# Patient Record
Sex: FEMALE | Race: WHITE | NOT HISPANIC OR LATINO | Employment: PART TIME | URBAN - METROPOLITAN AREA
[De-identification: names, ages, dates, MRNs, and addresses within clinical notes are randomized per-mention and may not be internally consistent; named-entity substitution may affect disease eponyms.]

---

## 2019-03-15 ENCOUNTER — OFFICE VISIT (OUTPATIENT)
Dept: OBSTETRICS AND GYNECOLOGY | Facility: CLINIC | Age: 74
End: 2019-03-15
Payer: COMMERCIAL

## 2019-03-15 VITALS
SYSTOLIC BLOOD PRESSURE: 117 MMHG | DIASTOLIC BLOOD PRESSURE: 75 MMHG | HEART RATE: 96 BPM | WEIGHT: 203 LBS | BODY MASS INDEX: 30.77 KG/M2 | HEIGHT: 68 IN

## 2019-03-15 DIAGNOSIS — N89.8 VAGINAL LESION: Primary | ICD-10-CM

## 2019-03-15 DIAGNOSIS — N32.81 OAB (OVERACTIVE BLADDER): ICD-10-CM

## 2019-03-15 PROBLEM — M48.02 SPINAL STENOSIS IN CERVICAL REGION: Status: ACTIVE | Noted: 2019-03-15

## 2019-03-15 PROBLEM — M48.061 SPINAL STENOSIS OF LUMBAR REGION: Status: ACTIVE | Noted: 2019-03-15

## 2019-03-15 PROBLEM — K22.89 PRESBYESOPHAGUS: Status: ACTIVE | Noted: 2018-06-23

## 2019-03-15 PROBLEM — I48.91 ATRIAL FIBRILLATION: Status: ACTIVE | Noted: 2019-03-15

## 2019-03-15 PROBLEM — E78.00 PURE HYPERCHOLESTEROLEMIA: Status: ACTIVE | Noted: 2019-03-15

## 2019-03-15 PROBLEM — E78.5 DYSLIPIDEMIA: Status: ACTIVE | Noted: 2019-03-15

## 2019-03-15 PROBLEM — D17.20 LIPOMA OF FOREARM: Status: ACTIVE | Noted: 2019-03-15

## 2019-03-15 PROBLEM — M72.2 PLANTAR FASCIITIS: Status: ACTIVE | Noted: 2019-03-15

## 2019-03-15 PROBLEM — R29.818 NEUROGENIC CLAUDICATION: Status: ACTIVE | Noted: 2019-03-15

## 2019-03-15 PROBLEM — J45.909 REACTIVE AIRWAY DISEASE: Status: ACTIVE | Noted: 2018-05-25

## 2019-03-15 PROBLEM — G60.9 IDIOPATHIC PERIPHERAL NEUROPATHY: Status: ACTIVE | Noted: 2019-03-15

## 2019-03-15 PROBLEM — M25.559 HIP PAIN: Status: ACTIVE | Noted: 2019-03-15

## 2019-03-15 PROBLEM — K21.9 GASTROESOPHAGEAL REFLUX DISEASE: Status: ACTIVE | Noted: 2019-03-15

## 2019-03-15 PROBLEM — I10 BENIGN ESSENTIAL HYPERTENSION: Status: ACTIVE | Noted: 2019-03-15

## 2019-03-15 PROCEDURE — 99204 OFFICE O/P NEW MOD 45 MIN: CPT | Mod: 25,S$GLB,, | Performed by: OBSTETRICS & GYNECOLOGY

## 2019-03-15 PROCEDURE — 57100 BIOPSY (GYNECOLOGICAL): ICD-10-PCS | Mod: S$GLB,,, | Performed by: OBSTETRICS & GYNECOLOGY

## 2019-03-15 PROCEDURE — 57100 BIOPSY VAGINAL MUCOSA SIMPLE: CPT | Mod: S$GLB,,, | Performed by: OBSTETRICS & GYNECOLOGY

## 2019-03-15 PROCEDURE — 99204 PR OFFICE/OUTPT VISIT, NEW, LEVL IV, 45-59 MIN: ICD-10-PCS | Mod: 25,S$GLB,, | Performed by: OBSTETRICS & GYNECOLOGY

## 2019-03-15 RX ORDER — ATORVASTATIN CALCIUM 40 MG/1
TABLET, FILM COATED ORAL
COMMUNITY

## 2019-03-15 RX ORDER — OXYBUTYNIN CHLORIDE 10 MG/1
10 TABLET, EXTENDED RELEASE ORAL DAILY
Qty: 30 TABLET | Refills: 11 | Status: SHIPPED | OUTPATIENT
Start: 2019-03-15 | End: 2023-04-14 | Stop reason: SDUPTHER

## 2019-03-15 RX ORDER — GABAPENTIN 300 MG/1
CAPSULE ORAL
COMMUNITY

## 2019-03-15 RX ORDER — HYDROCHLOROTHIAZIDE 25 MG/1
TABLET ORAL
COMMUNITY
Start: 2018-05-25 | End: 2020-09-24

## 2019-03-15 RX ORDER — OXYBUTYNIN CHLORIDE 5 MG/1
TABLET, EXTENDED RELEASE ORAL
COMMUNITY
Start: 2018-05-25 | End: 2019-03-15 | Stop reason: DRUGHIGH

## 2019-03-15 RX ORDER — MONTELUKAST SODIUM 10 MG/1
TABLET ORAL
COMMUNITY
End: 2019-03-30 | Stop reason: SDUPTHER

## 2019-03-15 NOTE — PROCEDURES
Biopsy (Gynecological)  Date/Time: 3/15/2019 10:26 AM  Performed by: Apolinar Moffett MD  Authorized by: Apolinar Moffett MD     Consent Done?:  Yes (Verbal)  Local anesthesia used?: Yes    Anesthesia:  Local infiltration  Local anesthetic:  Lidocaine 1% with epinephrine and lidocaine 2% topical gel    Biopsy Location:  Vagina  Vagina:     Complexity:  Simple    Site:  Right Vaginal Side Wall  Estimated blood loss (cc):  0   Patient tolerated the procedure well with no immediate complications.

## 2019-03-16 NOTE — PROGRESS NOTES
"CC: Vaginal discharge    Bonny Luciano is a 73 y.o. female presents with complaint of vaginal discoloration for 7-8 months. She also noted a rash for the last 5 days on the outer vulva.     Past Medical History:   Diagnosis Date    Bladder problem     Hyperlipidemia     Hypertension      History reviewed. No pertinent surgical history.  Social History     Tobacco Use    Smoking status: Never Smoker   Substance Use Topics    Alcohol use: Yes     Frequency: 2-4 times a month    Drug use: No     History reviewed. No pertinent family history.  OB History   No data available         ROS:  GENERAL: No fever, chills, fatigability or weight loss.  VULVAR: No pain, no lesions and no itching.  VAGINAL: No relaxation, no itching, no discharge, no abnormal bleeding and no lesions.  ABDOMEN: No abdominal pain. Denies nausea. Denies vomiting. No diarrhea. No constipation  BREAST: Denies pain. No lumps. No discharge.  URINARY: No incontinence, no nocturia, no frequency and no dysuria.  CARDIOVASCULAR: No chest pain. No shortness of breath. No leg cramps.  NEUROLOGICAL: No headaches. No vision changes.      /75 (BP Location: Left arm, Patient Position: Sitting)   Pulse 96   Ht 5' 8" (1.727 m)   Wt 92.1 kg (203 lb)   BMI 30.87 kg/m²     PHYSICAL EXAM:  NAD  Soft NTND   NEFG erythematous rash on bilateral labia majora. Right gluteal fold at perineum has a cluster of blisters   Vagina with black discoloration in patches that are flat until jail up the vagina   No cce     ASSESSMENT and PLAN:    ICD-10-CM ICD-9-CM    1. Vaginal lesion N89.8 623.8 Biopsy (Gynecological)           HSV swab collected   Discussed bladder leakage - increase oxybutynin   bx taken of discolored area   FOLLOW UP: PRN will call with bx results   "

## 2019-03-18 LAB
HSV 1 DNA: NEGATIVE
HSV 2 DNA: NEGATIVE
SPECIMEN SITE: NORMAL

## 2019-03-22 ENCOUNTER — TELEPHONE (OUTPATIENT)
Dept: GYNECOLOGIC ONCOLOGY | Facility: CLINIC | Age: 74
End: 2019-03-22

## 2019-03-22 NOTE — TELEPHONE ENCOUNTER
----- Message from Mik Cruz MD sent at 3/21/2019  4:27 PM CDT -----  I can see her Tuesday at 10 AM.     Stevie,   Please call her and give her an appt to see me Tuesday 3/26 at 10 AM.     Thank you.    ----- Message -----  From: Elif Ruffin MD  Sent: 3/21/2019   3:11 PM  To: Mik Cruz MD, MD Dr. Zuhair Porter--one of the [facility] ob gyns called me today regarding this patient. New vaginal melanoma/melanoma in situ per path.     Can either of you see her in next coming weeks?     ----- Message -----  From: Freda Mazariegos MA  Sent: 3/21/2019   2:27 PM  To: Elif Ruffin MD    The pt for previous message     ----- Message -----  From: Christine Veloz  Sent: 3/21/2019   2:20 PM  To: Augustin Asencio Staff    Name of Who is Calling:Dr. Lopes ([facility])      What is the request in detail: Dr lopes wants to speak directly to Dr Ruffin in ref to the pt       Can the clinic reply by [facility]:   No       What Number to Call Back if not in [facility]: [phone]

## 2019-03-22 NOTE — TELEPHONE ENCOUNTER
----- Message from Smita Still LPN sent at 3/22/2019 10:04 AM CDT -----  Contact: VAUGHN RICKETTS [[id number]]      ----- Message -----  From: Pushpa Montelongo  Sent: 3/22/2019   9:42 AM  To: Nancy BREEN Staff    Type:  Patient Returning Call    Who Called: VAUGHN RICKETTS [[id number]]    Who Left Message for Patient: Stevie Byrd    Does the patient know what this is regarding? Pt advises March 26 th works for her.    Best Call Back Number:[phone]    Additional Information:

## 2019-03-25 ENCOUNTER — TELEPHONE (OUTPATIENT)
Dept: GYNECOLOGIC ONCOLOGY | Facility: CLINIC | Age: 74
End: 2019-03-25

## 2019-03-25 ENCOUNTER — DOCUMENTATION ONLY (OUTPATIENT)
Dept: GYNECOLOGIC ONCOLOGY | Facility: HOSPITAL | Age: 74
End: 2019-03-25

## 2019-03-25 NOTE — PROGRESS NOTES
"Subjective:       Patient ID: Bonny Luciano is a 73 y.o. female.    Chief Complaint: Consult (ref by Dr Moffett)    HPI     73yr old referred from Dr. Moffett for vulvar melanoma. Patient reports color changes to labia for the past year. No itching, discharge or bleeding. Not painful. No other GYN complaints.      Dr. Moffett notes: "erythematous rash on bilateral labia majora. Right gluteal fold at perineum has a cluster of blisters. Vagina with black discoloration in patches that are flat until intermediate up the vagina"     Biopsied and resulted atypical junctional melanocytic proliferation. Atypical melanocytes extend to the edge of the biopsy specimen. Re-excision with adequate margins is recommended.     HSV culture negative.     She has been referred for further evaluation.     Surgical history: hysterectomy    Family history negative for breast, uterine, ovarian and colon cancer. Patient has no history of abnormal skin lesions or skin cancer.    Past Medical History:   Diagnosis Date    Bladder disorder     Bladder problem     Hyperlipidemia     Hypertension     Malignant melanoma of torso excluding breast 3/26/2019     Past Surgical History:   Procedure Laterality Date    HAND SURGERY      HYSTERECTOMY       Family History   Problem Relation Age of Onset    Diabetes Mother      Social History     Tobacco Use    Smoking status: Never Smoker    Smokeless tobacco: Never Used   Substance Use Topics    Alcohol use: Yes     Frequency: 2-4 times a month    Drug use: No     Review of patient's allergies indicates:  No Known Allergies    Current Outpatient Medications:     albuterol (PROVENTIL) 2.5 mg /3 mL (0.083 %) nebulizer solution, Take 2.5 mg by nebulization as needed. , Disp: , Rfl: 0    atorvastatin (LIPITOR) 40 MG tablet, atorvastatin 40 mg tablet  Take 1 tablet every day by oral route., Disp: , Rfl:     cefdinir (OMNICEF) 300 MG capsule, Take 600 mg by mouth once daily. , Disp: , Rfl: 0    " "CHERATUSSIN AC  mg/5 mL syrup, Take 5 mLs by mouth every 6 (six) hours as needed. , Disp: , Rfl: 0    gabapentin (NEURONTIN) 300 MG capsule, gabapentin 300 mg capsule  TAKE ONE CAPSULE BY MOUTH THREE TIMES DAILY THEREAFTER, Disp: , Rfl:     hydroCHLOROthiazide (HYDRODIURIL) 25 MG tablet, hydrochlorothiazide 25 mg tablet  Take 1 tablet every day by oral route., Disp: , Rfl:     methylPREDNISolone (MEDROL DOSEPACK) 4 mg tablet, Take 4 mg by mouth once daily. , Disp: , Rfl: 0    montelukast (SINGULAIR) 10 mg tablet, montelukast 10 mg tablet  TK ONE T PO D, Disp: , Rfl:     oxybutynin (DITROPAN-XL) 10 MG 24 hr tablet, Take 1 tablet (10 mg total) by mouth once daily., Disp: 30 tablet, Rfl: 11    ranitidine (ZANTAC) 150 MG capsule, ranitidine 150 mg capsule  Take 1 capsule twice a day by oral route., Disp: , Rfl:   No current facility-administered medications for this visit.     Review of Systems   Constitutional: Negative for appetite change, chills, diaphoresis, fatigue, fever and unexpected weight change.   Respiratory: Negative for cough, chest tightness, shortness of breath and wheezing.    Cardiovascular: Negative for chest pain, palpitations and leg swelling.   Gastrointestinal: Negative for abdominal distention, abdominal pain, blood in stool, constipation, diarrhea, nausea and vomiting.   Genitourinary: Positive for genital sores. Negative for difficulty urinating, dysuria, flank pain, frequency, hematuria, pelvic pain, vaginal bleeding, vaginal discharge and vaginal pain.   Musculoskeletal: Negative for arthralgias and back pain.   Skin: Positive for color change. Negative for rash.   Neurological: Negative for dizziness, weakness, numbness and headaches.   Hematological: Negative for adenopathy.   Psychiatric/Behavioral: Negative for confusion and sleep disturbance. The patient is not nervous/anxious.        Objective:   /68   Pulse 84   Ht 5' 8" (1.727 m)   Wt 90.7 kg (199 lb 15.3 oz)   " BMI 30.40 kg/m²      Physical Exam   Constitutional: She is oriented to person, place, and time. She appears well-developed and well-nourished. No distress.   HENT:   Head: Normocephalic and atraumatic.   Eyes: No scleral icterus.   Neck: Normal range of motion.   Cardiovascular: Normal rate and regular rhythm.   No murmur heard.  Pulmonary/Chest: Effort normal and breath sounds normal. No respiratory distress. She has no wheezes.   Abdominal: Soft. She exhibits no distension and no mass. There is no tenderness. There is no guarding.   Musculoskeletal: Normal range of motion. She exhibits no edema.   Neurological: She is alert and oriented to person, place, and time.   Skin: Skin is warm and dry. No rash noted. She is not diaphoretic. No pallor.   Psychiatric: She has a normal mood and affect. Her behavior is normal.   Nursing note and vitals reviewed.      Assessment:       1. Malignant melanoma of torso excluding breast        Plan:   Malignant melanoma of torso excluding breast  I discussed with the patient and her  that surgical management for melanoma of the vulva given the extent of disease would require at least and anterior pelvic exenteration and perhaps a TPE.     I told them that I wanted to get CT scan of the chest abdomen and pelvis to rule out metastatic disease.    If she has metastatic disease and there would be no role for surgery and she would require systemic treatment.    -     Basic metabolic panel; Future; Expected date: 03/26/2019  -     CT Chest With Contrast; Future; Expected date: 03/26/2019  -     CT Abdomen Pelvis With Contrast; Future; Expected date: 03/26/2019      Distress Screening Results: Psychosocial Distress screening score of Distress Score: 0 noted and reviewed. No intervention indicated.

## 2019-03-25 NOTE — PROGRESS NOTES
"73yr old referred from Dr. Moffett for a R vaginal sidewall lesion.     Dr. Moffett notes: "erythematous rash on bilateral labia majora. Right gluteal fold at perineum has a cluster of blisters. Vagina with black discoloration in patches that are flat until MCC up the vagina"    Biopsied and resulted atypical junctional melanocytic proliferation. Atypical melanocytes extend to the edge of the biopsy specimen. Re-excision with adequate margins is recommended.    HSV culture negative.    She has been referred for further evaluation.    Surgical history: hysterectomy  "

## 2019-03-26 ENCOUNTER — INITIAL CONSULT (OUTPATIENT)
Dept: GYNECOLOGIC ONCOLOGY | Facility: CLINIC | Age: 74
End: 2019-03-26
Payer: COMMERCIAL

## 2019-03-26 ENCOUNTER — LAB VISIT (OUTPATIENT)
Dept: LAB | Facility: HOSPITAL | Age: 74
End: 2019-03-26
Attending: OBSTETRICS & GYNECOLOGY
Payer: COMMERCIAL

## 2019-03-26 ENCOUNTER — HOSPITAL ENCOUNTER (OUTPATIENT)
Dept: RADIOLOGY | Facility: HOSPITAL | Age: 74
Discharge: HOME | End: 2019-03-26
Attending: OBSTETRICS & GYNECOLOGY
Payer: COMMERCIAL

## 2019-03-26 VITALS
HEART RATE: 84 BPM | BODY MASS INDEX: 30.3 KG/M2 | WEIGHT: 199.94 LBS | DIASTOLIC BLOOD PRESSURE: 68 MMHG | SYSTOLIC BLOOD PRESSURE: 133 MMHG | HEIGHT: 68 IN

## 2019-03-26 DIAGNOSIS — C43.59 MALIGNANT MELANOMA OF TORSO EXCLUDING BREAST: ICD-10-CM

## 2019-03-26 DIAGNOSIS — C43.59 MALIGNANT MELANOMA OF TORSO EXCLUDING BREAST: Primary | ICD-10-CM

## 2019-03-26 LAB
ANION GAP SERPL CALC-SCNC: 9 MMOL/L (ref 8–16)
BUN SERPL-MCNC: 24 MG/DL (ref 8–23)
CALCIUM SERPL-MCNC: 9.9 MG/DL (ref 8.7–10.5)
CHLORIDE SERPL-SCNC: 103 MMOL/L (ref 95–110)
CO2 SERPL-SCNC: 32 MMOL/L (ref 23–29)
CREAT SERPL-MCNC: 1 MG/DL (ref 0.5–1.4)
EST. GFR  (AFRICAN AMERICAN): >60 ML/MIN/1.73 M^2
EST. GFR  (NON AFRICAN AMERICAN): 56 ML/MIN/1.73 M^2
GLUCOSE SERPL-MCNC: 147 MG/DL (ref 70–110)
POTASSIUM SERPL-SCNC: 3.6 MMOL/L (ref 3.5–5.1)
SODIUM SERPL-SCNC: 144 MMOL/L (ref 136–145)

## 2019-03-26 PROCEDURE — 25500020 PHARM REV CODE 255: Performed by: OBSTETRICS & GYNECOLOGY

## 2019-03-26 PROCEDURE — 74177 CT ABD & PELVIS W/CONTRAST: CPT | Mod: 26,,, | Performed by: RADIOLOGY

## 2019-03-26 PROCEDURE — 99999 PR PBB SHADOW E&M-EST. PATIENT-LVL III: ICD-10-PCS | Mod: PBBFAC,,, | Performed by: OBSTETRICS & GYNECOLOGY

## 2019-03-26 PROCEDURE — 99213 OFFICE O/P EST LOW 20 MIN: CPT | Mod: PBBFAC,25 | Performed by: OBSTETRICS & GYNECOLOGY

## 2019-03-26 PROCEDURE — 88305 TISSUE EXAM BY PATHOLOGIST: CPT | Performed by: PATHOLOGY

## 2019-03-26 PROCEDURE — 71260 CT THORAX DX C+: CPT | Mod: 26,,, | Performed by: RADIOLOGY

## 2019-03-26 PROCEDURE — 71260 CT THORAX DX C+: CPT | Mod: TC

## 2019-03-26 PROCEDURE — 80048 BASIC METABOLIC PNL TOTAL CA: CPT

## 2019-03-26 PROCEDURE — 88305 TISSUE EXAM BY PATHOLOGIST: CPT | Mod: 26,,, | Performed by: PATHOLOGY

## 2019-03-26 PROCEDURE — 36415 COLL VENOUS BLD VENIPUNCTURE: CPT

## 2019-03-26 PROCEDURE — 71260 CT CHEST WITH CONTRAST: ICD-10-PCS | Mod: 26,,, | Performed by: RADIOLOGY

## 2019-03-26 PROCEDURE — 99205 OFFICE O/P NEW HI 60 MIN: CPT | Mod: S$GLB,,, | Performed by: OBSTETRICS & GYNECOLOGY

## 2019-03-26 PROCEDURE — 99205 PR OFFICE/OUTPT VISIT, NEW, LEVL V, 60-74 MIN: ICD-10-PCS | Mod: S$GLB,,, | Performed by: OBSTETRICS & GYNECOLOGY

## 2019-03-26 PROCEDURE — 99999 PR PBB SHADOW E&M-EST. PATIENT-LVL III: CPT | Mod: PBBFAC,,, | Performed by: OBSTETRICS & GYNECOLOGY

## 2019-03-26 PROCEDURE — 88342 IMHCHEM/IMCYTCHM 1ST ANTB: CPT | Performed by: PATHOLOGY

## 2019-03-26 PROCEDURE — 88305 TISSUE SPECIMEN TO PATHOLOGY, OBSTETRICS/GYNECOLOGY: ICD-10-PCS | Mod: 26,,, | Performed by: PATHOLOGY

## 2019-03-26 PROCEDURE — 74177 CT ABDOMEN PELVIS WITH CONTRAST: ICD-10-PCS | Mod: 26,,, | Performed by: RADIOLOGY

## 2019-03-26 PROCEDURE — 88342 TISSUE SPECIMEN TO PATHOLOGY, OBSTETRICS/GYNECOLOGY: ICD-10-PCS | Mod: 26,,, | Performed by: PATHOLOGY

## 2019-03-26 PROCEDURE — 88342 IMHCHEM/IMCYTCHM 1ST ANTB: CPT | Mod: 26,,, | Performed by: PATHOLOGY

## 2019-03-26 PROCEDURE — 74177 CT ABD & PELVIS W/CONTRAST: CPT | Mod: TC

## 2019-03-26 RX ORDER — METHYLPREDNISOLONE 4 MG/1
4 TABLET ORAL DAILY
Refills: 0 | COMMUNITY
Start: 2019-03-23 | End: 2019-05-16

## 2019-03-26 RX ORDER — CEFDINIR 300 MG/1
600 CAPSULE ORAL DAILY
Refills: 0 | COMMUNITY
Start: 2019-03-23 | End: 2019-05-16

## 2019-03-26 RX ORDER — ALBUTEROL SULFATE 0.83 MG/ML
2.5 SOLUTION RESPIRATORY (INHALATION)
Refills: 0 | COMMUNITY
Start: 2019-03-23

## 2019-03-26 RX ADMIN — IOHEXOL 100 ML: 350 INJECTION, SOLUTION INTRAVENOUS at 12:03

## 2019-03-26 NOTE — LETTER
March 26, 2019      Apolinar Moffett MD  [address]  [address]  [facility] [address]           [facility] - GYN Oncology  [address]  [place] [address]  Phone: [phone]          Patient: Bonny Luciano   MR Number: [id number]   YOB: 1945   Date of Visit: 3/26/2019       Dear Dr. Apolinar Moffett:    Thank you for referring Bonny Luciano to me for evaluation. Attached you will find relevant portions of my assessment and plan of care.    If you have questions, please do not hesitate to call me. I look forward to following Bonny Luciano along with you.    Sincerely,    Mik Cruz MD    Enclosure  CC:  No Recipients    If you would like to receive this communication electronically, please contact [email or web address] or [phone] to request more information on [employer] Link access.    For providers and/or their staff who would like to refer a patient to [facility], please contact us through our one-stop-shop provider referral line, [facility], at [phone].    If you feel you have received this communication in error or would no longer like to receive these types of communications, please e-mail [email or web address]

## 2019-03-30 RX ORDER — MONTELUKAST SODIUM 10 MG/1
TABLET ORAL
Qty: 90 TABLET | Refills: 1 | Status: SHIPPED | OUTPATIENT
Start: 2019-03-30 | End: 2019-04-04 | Stop reason: SDUPTHER

## 2019-04-04 ENCOUNTER — OFFICE VISIT (OUTPATIENT)
Dept: FAMILY MEDICINE | Facility: CLINIC | Age: 74
End: 2019-04-04
Payer: COMMERCIAL

## 2019-04-04 VITALS
RESPIRATION RATE: 14 BRPM | TEMPERATURE: 99 F | HEART RATE: 80 BPM | WEIGHT: 197 LBS | DIASTOLIC BLOOD PRESSURE: 78 MMHG | SYSTOLIC BLOOD PRESSURE: 122 MMHG | BODY MASS INDEX: 29.95 KG/M2 | OXYGEN SATURATION: 99 %

## 2019-04-04 DIAGNOSIS — R05.8 DRY COUGH: ICD-10-CM

## 2019-04-04 DIAGNOSIS — J98.11 DISCOID ATELECTASIS: Primary | ICD-10-CM

## 2019-04-04 DIAGNOSIS — R91.8 PULMONARY NODULES: ICD-10-CM

## 2019-04-04 DIAGNOSIS — J45.40 MODERATE PERSISTENT REACTIVE AIRWAY DISEASE WITHOUT COMPLICATION: ICD-10-CM

## 2019-04-04 DIAGNOSIS — B37.0 THRUSH OF MOUTH AND ESOPHAGUS: ICD-10-CM

## 2019-04-04 DIAGNOSIS — K22.89 PRESBYESOPHAGUS: ICD-10-CM

## 2019-04-04 DIAGNOSIS — B37.81 THRUSH OF MOUTH AND ESOPHAGUS: ICD-10-CM

## 2019-04-04 PROCEDURE — 99214 PR OFFICE/OUTPT VISIT, EST, LEVL IV, 30-39 MIN: ICD-10-PCS | Mod: S$GLB,,, | Performed by: NURSE PRACTITIONER

## 2019-04-04 PROCEDURE — 99214 OFFICE O/P EST MOD 30 MIN: CPT | Mod: S$GLB,,, | Performed by: NURSE PRACTITIONER

## 2019-04-04 RX ORDER — MONTELUKAST SODIUM 10 MG/1
10 TABLET ORAL DAILY
Qty: 90 TABLET | Refills: 1 | Status: SHIPPED | OUTPATIENT
Start: 2019-04-04 | End: 2019-04-04 | Stop reason: SDUPTHER

## 2019-04-04 RX ORDER — FLUTICASONE FUROATE AND VILANTEROL 100; 25 UG/1; UG/1
1 POWDER RESPIRATORY (INHALATION) DAILY
Qty: 14 EACH | Refills: 0
Start: 2019-04-04 | End: 2019-04-18

## 2019-04-04 RX ORDER — MONTELUKAST SODIUM 10 MG/1
10 TABLET ORAL DAILY
Qty: 90 TABLET | Refills: 1 | Status: SHIPPED | OUTPATIENT
Start: 2019-04-04 | End: 2019-05-16

## 2019-04-04 RX ORDER — BENZONATATE 200 MG/1
200 CAPSULE ORAL 3 TIMES DAILY PRN
Qty: 30 CAPSULE | Refills: 0 | Status: SHIPPED | OUTPATIENT
Start: 2019-04-04 | End: 2019-04-14

## 2019-04-04 RX ORDER — FLUCONAZOLE 100 MG/1
200 TABLET ORAL DAILY
Qty: 2 TABLET | Refills: 0 | Status: SHIPPED | OUTPATIENT
Start: 2019-04-04 | End: 2019-04-05

## 2019-04-04 RX ORDER — NYSTATIN 100000 [USP'U]/ML
500000 SUSPENSION ORAL 4 TIMES DAILY
Status: SHIPPED | OUTPATIENT
Start: 2019-04-04 | End: 2019-04-18

## 2019-04-04 NOTE — ASSESSMENT & PLAN NOTE
Add singulair daily.     Instructed to increase nexium to BID x 14 days.     Given Sample of Breo 100 x 14 days.     RTC in 14 days.

## 2019-04-04 NOTE — PROGRESS NOTES
Subjective:      Patient ID: Bonny Luciano is a 73 y.o. female.    Chief Complaint: Cough (Dry, hacking cough, draining sinus. Got shot U/C & abx and got better. Now worsened. Diarrhea SUN-TUES. )      Patient is here today for a constant dry cough for the past 3 weeks.  She reports recently having a CT scan done in [place] for some female related issues.  This was done on 03/29/2019.  She was told there was no pneumonia at the time.  Prior to her CT scan, the patient reports going to a local urgent care and receiving antibiotics shot as well as steroid shot.  She was also discharged home on oral antibiotics and a Medrol Dosepak.  She was also sent home with cough liquid.  She reports no improvement or change in her status with the treatment she received.  Her cough is dry and hacking.  She continues to take ranitidine b.i.d. as well as daily Nexium and cetirizine.  She no longer takes Singulair.  She currently denies fever, chills, body aches, peripheral edema, chest pain, shortness of breath, wheezing.  She is on 80 mg Symbicort 2 puffs b.i.d.  she also has a Ventolin rescue inhaler at home.  She has never smoked.  Denies asbestosis exposure.  Denies chemical exposures.     She describes a sensation in her sinus region as fullness in the inability to get anything out.  She denies facial pain, nasal discharge, rhinorrhea.  She does admit to some posterior nasal discharge at times.  Denies throat pain.     In regards to her prior issues of presbyesophagus, she reports seeing a doctor Hernandez at [facility] and being put on Nexium 40 mg.  Denies choking.  No recent change to diet.  She does admit to intermittent hoarseness.     No other acute issues at this time.       ROS:   Review of Systems   Constitutional: Negative.    HENT: Positive for postnasal drip and voice change. Negative for congestion, dental problem, drooling, ear discharge, ear pain, facial swelling, hearing loss, mouth sores, nosebleeds,  rhinorrhea, sinus pressure, sinus pain, sneezing, sore throat, tinnitus and trouble swallowing.    Eyes: Negative.    Respiratory: Negative.    Cardiovascular: Negative.    Gastrointestinal: Negative.    Endocrine: Negative.    Genitourinary: Negative.    Musculoskeletal: Negative.    Skin: Negative.    Allergic/Immunologic: Negative.    Neurological: Negative.    Hematological: Negative.    Psychiatric/Behavioral: Negative.    All other systems reviewed and are negative.    Objective:   Physical Exam   Constitutional: She is oriented to person, place, and time. She appears well-developed and well-nourished.   HENT:   Head: Normocephalic and atraumatic.   Right Ear: External ear normal.   Left Ear: External ear normal.   Nose: Nose normal.   Mouth/Throat: Uvula is midline. Mucous membranes are not pale, dry and not cyanotic. No trismus in the jaw. No uvula swelling. Posterior oropharyngeal erythema present. No oropharyngeal exudate or posterior oropharyngeal edema.       Eyes: Pupils are equal, round, and reactive to light.   Neck: Trachea normal and normal range of motion. Neck supple. No thyromegaly present.   Cardiovascular: Normal rate, regular rhythm and normal heart sounds.  No extrasystoles are present. Exam reveals no gallop and no friction rub.   No murmur heard.  Pulmonary/Chest: Effort normal. She has decreased breath sounds in the right lower field and the left lower field. She has no wheezes. She has no rhonchi. She has no rales.   Abdominal: Soft. Bowel sounds are normal.   Musculoskeletal: Normal range of motion.   Lymphadenopathy:        Head (right side): No submental, no submandibular, no tonsillar, no preauricular, no posterior auricular and no occipital adenopathy present.        Head (left side): No submental, no submandibular, no tonsillar, no preauricular, no posterior auricular and no occipital adenopathy present.     She has no cervical adenopathy.        Right cervical: No superficial  cervical adenopathy present.       Left cervical: No superficial cervical adenopathy present.        Right: No supraclavicular adenopathy present.        Left: No supraclavicular adenopathy present.   Neurological: She is alert and oriented to person, place, and time.   Skin: Skin is warm and dry. Capillary refill takes less than 2 seconds.   Psychiatric: She has a normal mood and affect. Her behavior is normal. Judgment and thought content normal.   Nursing note and vitals reviewed.    Assessment:     1. Thrush of mouth and esophagus    2. Pulmonary nodules    3. Discoid atelectasis    4. Dry cough    5. Presbyesophagus    6. Moderate persistent reactive airway disease without complication      Plan:     Problem List Items Addressed This Visit        Pulmonary    Reactive airway disease    Overview     On Ceterizine, Ranitidine BID, and Nexium 40mg         Current Assessment & Plan     Add singulair daily.     Instructed to increase nexium to BID x 14 days.     Given Sample of Breo 100 x 14 days.     RTC in 14 days.         Discoid atelectasis    Overview     Bibasilar per recent CT on 3/29/19         Current Assessment & Plan     Discussed her Pulmo findings of CT scan at length and need to seed Pulmo.  Pt agrees.  Refer to Pulmo for proper workup.          Pulmonary nodules    Overview     See CT scan            ID    Thrush of mouth and esophagus - Primary    Current Assessment & Plan     2/t oral steroids + IM steroids + recent ABX.               GI    Presbyesophagus    Overview     See motility study. Likely contributing to her Pulmonary issues.            Other Visit Diagnoses     Dry cough

## 2019-04-04 NOTE — ASSESSMENT & PLAN NOTE
Discussed her Pulmo findings of CT scan at length and need to seed Pulmo.  Pt agrees.  Refer to Pulmo for proper workup.

## 2019-04-05 RX ORDER — NYSTATIN 100000 [USP'U]/ML
4 SUSPENSION ORAL 4 TIMES DAILY
Qty: 160 ML | Refills: 0 | Status: SHIPPED | OUTPATIENT
Start: 2019-04-05 | End: 2019-04-15

## 2019-04-15 ENCOUNTER — TELEPHONE (OUTPATIENT)
Dept: GYNECOLOGIC ONCOLOGY | Facility: CLINIC | Age: 74
End: 2019-04-15

## 2019-04-15 NOTE — TELEPHONE ENCOUNTER
Return patient call inform her that Dr. Cruz is in surgery today but I will forward her message to him. Patient voiced understanding. KJ

## 2019-04-15 NOTE — TELEPHONE ENCOUNTER
----- Message from Ghazala Dave sent at 4/15/2019  9:44 AM CDT -----  Contact: pt   Name of Who is Calling: VAUGHN RICKETTS [[id number]]    What is the request in detail:Patient states she is returning a call to Dr. Cruz....Please contact to further discuss and advise      Can the clinic reply by [facility]: No     What Number to Call Back [facility]: [phone].

## 2019-04-17 ENCOUNTER — TELEPHONE (OUTPATIENT)
Dept: GYNECOLOGIC ONCOLOGY | Facility: CLINIC | Age: 74
End: 2019-04-17

## 2019-04-17 DIAGNOSIS — D03.59 MELANOMA IN SITU OF TORSO EXCLUDING BREAST: ICD-10-CM

## 2019-04-17 NOTE — TELEPHONE ENCOUNTER
Patient was called regards to the biopsy of her vulva that shows melanoma in situ.  I explained to her that this is an extensive lesion that extends up in to the vagina.    An alternative to surgical management which would be radical surgery including an anterior pelvic exenteration would be the possibility of treating this area with Aldara. This will require additional biopsies to be done to rule out an invasive melanoma.    I stressed to the patient the importance of answering her phone when I office calls her so that we can arrange for a surgery date.    She voiced understanding.

## 2019-04-18 ENCOUNTER — OFFICE VISIT (OUTPATIENT)
Dept: FAMILY MEDICINE | Facility: CLINIC | Age: 74
End: 2019-04-18
Payer: COMMERCIAL

## 2019-04-18 VITALS
DIASTOLIC BLOOD PRESSURE: 60 MMHG | SYSTOLIC BLOOD PRESSURE: 120 MMHG | WEIGHT: 199 LBS | OXYGEN SATURATION: 98 % | BODY MASS INDEX: 30.26 KG/M2 | TEMPERATURE: 98 F | RESPIRATION RATE: 16 BRPM | HEART RATE: 100 BPM

## 2019-04-18 DIAGNOSIS — J45.40 MODERATE PERSISTENT REACTIVE AIRWAY DISEASE WITHOUT COMPLICATION: Primary | ICD-10-CM

## 2019-04-18 DIAGNOSIS — B37.81 THRUSH OF MOUTH AND ESOPHAGUS: ICD-10-CM

## 2019-04-18 DIAGNOSIS — B37.0 THRUSH OF MOUTH AND ESOPHAGUS: ICD-10-CM

## 2019-04-18 DIAGNOSIS — J98.11 DISCOID ATELECTASIS: ICD-10-CM

## 2019-04-18 PROCEDURE — 99212 OFFICE O/P EST SF 10 MIN: CPT | Mod: S$GLB,,, | Performed by: NURSE PRACTITIONER

## 2019-04-18 PROCEDURE — 99212 PR OFFICE/OUTPT VISIT, EST, LEVL II, 10-19 MIN: ICD-10-PCS | Mod: S$GLB,,, | Performed by: NURSE PRACTITIONER

## 2019-04-18 PROCEDURE — 1101F PR PT FALLS ASSESS DOC 0-1 FALLS W/OUT INJ PAST YR: ICD-10-PCS | Mod: CPTII,S$GLB,, | Performed by: NURSE PRACTITIONER

## 2019-04-18 PROCEDURE — 1101F PT FALLS ASSESS-DOCD LE1/YR: CPT | Mod: CPTII,S$GLB,, | Performed by: NURSE PRACTITIONER

## 2019-04-18 RX ORDER — FLUTICASONE FUROATE AND VILANTEROL 100; 25 UG/1; UG/1
1 POWDER RESPIRATORY (INHALATION) DAILY
Qty: 60 EACH | Refills: 6 | Status: SHIPPED | OUTPATIENT
Start: 2019-04-18 | End: 2019-06-20 | Stop reason: ALTCHOICE

## 2019-04-18 NOTE — ASSESSMENT & PLAN NOTE
Continue Breo.  [facility]    Also given # to Dr. Stewart. She will call for apt.

## 2019-04-18 NOTE — PROGRESS NOTES
"Subjective:      Patient ID: Bonny Luciano is a 73 y.o. female.    Chief Complaint: Thrush (dry cough improved, says Breo is "Fantastic and wants to continue!")      2 week f/u cough and thrush.      The patient reports resolution chronic cough with Breo inhaler.  In regards to her pulmonology referral, she has not received a call for an appointment at this time.     Resolution of thrush with fluconazole. Brushing her tongue now after inhaler use.     No acute issues reported.      ROS:   Review of Systems   Constitutional: Negative.    HENT: Negative.    Eyes: Negative.    Respiratory: Negative.    Cardiovascular: Negative.    Gastrointestinal: Negative.    Endocrine: Negative.    Genitourinary: Negative.    Musculoskeletal: Negative.    Skin: Negative.    Allergic/Immunologic: Negative.    Neurological: Negative.    Hematological: Negative.    Psychiatric/Behavioral: Negative.    All other systems reviewed and are negative.    Objective:   Physical Exam   Constitutional: She appears well-developed and well-nourished.   HENT:   Mouth/Throat: Uvula is midline, oropharynx is clear and moist and mucous membranes are normal.   Pulmonary/Chest: Effort normal and breath sounds normal.   Nursing note and vitals reviewed.    Assessment:     1. Moderate persistent reactive airway disease without complication    2. Discoid atelectasis    3. Thrush of mouth and esophagus      Plan:     Problem List Items Addressed This Visit        Pulmonary    Reactive airway disease - Primary    Overview     On Ceterizine, Ranitidine BID, and Nexium 40mg         Current Assessment & Plan     Continue Breo.  [facility]    Also given # to Dr. Stewart. She will call for apt.          Discoid atelectasis    Overview     Bibasilar per recent CT on 3/29/19            ID    RESOLVED: Thrush of mouth and esophagus        "

## 2019-04-23 ENCOUNTER — TELEPHONE (OUTPATIENT)
Dept: GYNECOLOGIC ONCOLOGY | Facility: CLINIC | Age: 74
End: 2019-04-23

## 2019-04-23 DIAGNOSIS — D03.8 MELANOMA IN SITU OF OTHER SITE: Primary | ICD-10-CM

## 2019-04-23 NOTE — TELEPHONE ENCOUNTER
----- Message from Marcial France sent at 4/23/2019 11:00 AM CDT -----  Contact: VAUGHN RICKETTS [[id number]]  Type:  Patient Returning Call    Who Called: VAUGHN RICKETTS [[id number]]    Who Left Message for Patient: Dr. Cruz    Does the patient know what this is regarding?:    Would the patient rather a call back or a response via [facility]? call    Best Call Back Number: [phone]    Additional Information:

## 2019-04-23 NOTE — TELEPHONE ENCOUNTER
Return patient call left voice mail message for patient to return call. Dr. Cruz also tried reaching the patient as well. LOU

## 2019-04-23 NOTE — TELEPHONE ENCOUNTER
Spoke with patient per Dr. Nancy champion surgery inform patient that I will schedule her surgery on 5/20 and she will also need an office visit before surgery to sign consents. Patient voiced understanding. Appointment scheduled and mailed to patient. I also inform Dr. Cruz. KJ

## 2019-05-03 ENCOUNTER — TELEPHONE (OUTPATIENT)
Dept: PREADMISSION TESTING | Facility: HOSPITAL | Age: 74
End: 2019-05-03

## 2019-05-03 DIAGNOSIS — Z01.818 PRE-OP TESTING: Primary | ICD-10-CM

## 2019-05-03 DIAGNOSIS — Z01.818 PREOP TESTING: Primary | ICD-10-CM

## 2019-05-03 NOTE — TELEPHONE ENCOUNTER
----- Message from Dawn Banda RN sent at 5/3/2019 12:29 PM CDT -----  Pt coming in 5/16 to see Dr. Cruz  Needs lab/ ekg/ POC

## 2019-05-06 ENCOUNTER — TELEPHONE (OUTPATIENT)
Dept: FAMILY MEDICINE | Facility: CLINIC | Age: 74
End: 2019-05-06

## 2019-05-06 ENCOUNTER — ANESTHESIA EVENT (OUTPATIENT)
Dept: SURGERY | Facility: HOSPITAL | Age: 74
End: 2019-05-06
Payer: MEDICARE

## 2019-05-06 ENCOUNTER — TELEPHONE (OUTPATIENT)
Dept: PREADMISSION TESTING | Facility: HOSPITAL | Age: 74
End: 2019-05-06

## 2019-05-06 NOTE — PRE ADMISSION SCREENING
Anesthesia Assessment: Preoperative EQUATION    Planned Procedure: Procedure(s) (LRB):  BIOPSY, VULVA (N/A)  Exam under anesthesia (N/A)  Requested Anesthesia Type:General  Surgeon: Mik Cruz MD  Service: OB/GYN  Known or anticipated Date of Surgery:5/20/2019    Surgeon notes: reviewed    Electronic QUestionnaire Assessment completed via nurse interview with patient.        No Aq        Triage considerations:       Previous anesthesia records:Not available    Last PCP note: within 3 months , PCP at [facility]  Segun Atkins NP      Subspecialty notes: gyn/ oncology    Other important co-morbidities:   Hx restrictive lung disease  Hx asthma  Hx Bronchitis  Discoid atelectasis  HTN  HLD  Bladder disorder  Malignant melanoma  Long-term anticoagulant -- Eliquis  A-Fib    Tests already available:  Available tests,  within 3 months , within [facility] .            Instructions given. (See in Nurse's note)    Optimization:  Anesthesia Preop Clinic Assessment  Indicated    Medical Opinion Indicated       Sub-specialist consult indicated:   Attempting pulmonary consult         Plan:    Testing:  Hematology Profile   Pre-anesthesia  visit       Visit focus: airway concerns     Consultation:Patient's PCP for a statement of optimization        Navigation: Pt . Lives in [place]. States sees NP as her PCP at [facility], Is followed by [facility] for A-Fib . (PCP states will take 2-3 mos to get pt seen by Pulmonary) [facility] for Eliquis instructions.

## 2019-05-06 NOTE — TELEPHONE ENCOUNTER
5/7/19 pt has PULM apt 5/15.        5/6 Spoke w/ Kelli at Dr Stewart (pulm) and they have been trying to contact pt. If pt sees NP Chiara it will be a few wks out - If she wants to see MD 30 days. Their ph [phone]  LVM for pt 11:59A with info.

## 2019-05-06 NOTE — ANESTHESIA PREPROCEDURE EVALUATION
Anesthesia Assessment: Preoperative EQUATION     Planned Procedure: Procedure(s) (LRB):  BIOPSY, VULVA (N/A)  Exam under anesthesia (N/A)  Requested Anesthesia Type:General  Surgeon: Mik Cruz MD  Service: OB/GYN  Known or anticipated Date of Surgery:5/20/2019     Surgeon notes: reviewed     Electronic QUestionnaire Assessment completed via nurse interview with patient.         No Aq           Triage considerations:         Previous anesthesia records:Not available     Last PCP note: within 3 months , PCP at [facility]  Segun Atkins NP       Subspecialty notes: gyn/ oncology     Other important co-morbidities:   Hx restrictive lung disease  Hx asthma  Hx Bronchitis  Discoid atelectasis  HTN  HLD  Bladder disorder  Malignant melanoma  Long-term anticoagulant -- Eliquis  A-Fib     Tests already available:  Available tests,  within 3 months , within [facility] .                            Instructions given. (See in Nurse's note)     Optimization:  Anesthesia Preop Clinic Assessment  Indicated    Medical Opinion Indicated                            Sub-specialist consult indicated:   Attempting pulmonary consult                                        Plan:    Testing:  Hematology Profile   Pre-anesthesia  visit                                        Visit focus: airway concerns                           Consultation:Patient's PCP for a statement of optimization                              Navigation: Pt . Lives in [place]. States sees NP as her PCP at [facility], Is followed by [facility] for A-Fib . (PCP [facility] will take 2-3 mos to get pt seen by Pulmonary) [facility] for Eliquis instructions.  Patient scheduled for Pulm follow up apt, to be cleared by OPOC Dr. Day.    Dr. Cruz plans on spinal or local MAC for procedure.                                                                                                                      05/06/2019  Bonny Ankita is a 73 y.o.,  female.    Anesthesia Evaluation      I have reviewed the Medications.   Steroids Taken In Past Year: Prednisone    Review of Systems  Anesthesia Hx:  No problems with previous Anesthesia History of prior surgery of interest to airway management or planning: Previous anesthesia: MAC  1 month ago: Colonoscopy with MAC.  Denies Family Hx of Anesthesia complications.   Denies Personal Hx of Anesthesia complications.   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 Denies Tobacco Use. Alcohol Use: Pt consumes occasional,    Hematology/Oncology:         Melanoma Current/Recent Cancer.   EENT/Dental:   Denies Throat Symptoms Denies Jaw Problems   Cardiovascular:   Hypertension, well controlled  Functional Capacity low / < 4 METS, can walk up one flight of stairs: + SOB/denies CP  Denies Coronary Artery Disease.  Hypertension , Recent typical clinic B/P of 125/64 @ POC visit  Disorder of Cardiac Rhythm, Atrial Fibrillation (Currently on Eliquis), S/P electrical cardioversion    Pulmonary:   Asthma mild Restrictive Lung Disease  Discoid atelectasis  Hx Pulmonary nodules per CT 3/26/19 Asthma:  last episode was 1 - 12 months ago.   Inhaler use is rescue inhaler PRN and maintenance inhaler daily.  Possible Obstructive Sleep Apnea , (STOP/BANG) Symptoms S - Snoring (loud), P - Pressure being treated for high BP and A - Age > 50    Renal/:  Renal Symptoms/Infections/Stones: urgency.  Denies Kidney Function/Disease    Hepatic/GI:   GERD, well controlled  Esophageal / Stomach Disorders Gerd Controlled by chronic antireflux medication.  Denies Liver Disease    Musculoskeletal:  Spine Disorders: Degenerative Joint Disease Spinal Stenosis, Lumbar Spinal Stenosis   Neurological:  Neuro Symptoms  Peripheral Neuropathy  Denies Seizure Disorder  Denies CVA - Cerebrovasular Accident  Denies TIA - Transient Ischemic Attack    Endocrine:  Denies Diabetes  Denies Thyroid Disease  Metabolic Disorders,  Hyperlipoproteinemia      Physical Exam  General:  Well nourished    Airway/Jaw/Neck:  Airway Findings: Mouth Opening: Small, but > 3cm Tongue: Normal  General Airway Assessment: Adult  Mallampati: III  TM Distance: Normal, at least 6 cm  Jaw/Neck Findings:  Neck ROM: Normal ROM      Dental:  Dental Findings: (Permanent) In tact   Chest/Lungs:  Chest/Lungs Findings: Clear to auscultation, Normal Respiratory Rate     Heart/Vascular:  Heart Findings: Rate: Normal  Rhythm: Irregularly Irregular  Vascular Findings: Normal       Mental Status:  Mental Status Findings:  Cooperative, Alert and Oriented         Anesthesia Plan  Type of Anesthesia, risks & benefits discussed:  Anesthesia Type:  general  Patient's Preference:   Intra-op Monitoring Plan: standard ASA monitors  Intra-op Monitoring Plan Comments:   Post Op Pain Control Plan: per primary service following discharge from PACU  Post Op Pain Control Plan Comments:   Induction:   IV  Beta Blocker:  Patient is not currently on a Beta-Blocker (No further documentation required).       Informed Consent: Patient understands risks and agrees with Anesthesia plan.  Questions answered. Anesthesia consent signed with patient.  ASA Score: 3     Day of Surgery Review of History & Physical:    H&P update referred to the surgeon.         Ready For Surgery From Anesthesia Perspective.     The patient was seen by Perioperative Internal Medicine physician Dr. Day on 5/15/19, please see recommendations.    Will hold Eliquis x 3 days per Dr. Day    Outside Pulmonary note in media 5/15/19    Amrik Lopez RN

## 2019-05-06 NOTE — PRE-PROCEDURE INSTRUCTIONS
Spoke to pt regarding hx--of med ,states on eliquis which was not on med card, have attempted to get [facility]. For instructions had to leave a message. Have contacted [place] PCP to attempt to get pulmonary consult.,

## 2019-05-15 NOTE — PRE-PROCEDURE INSTRUCTIONS
[facility] again requested they fax us her instructions on Eliquis.

## 2019-05-15 NOTE — PRE-PROCEDURE INSTRUCTIONS
[facility] to [facility] for [facility] instructions since 5/6-- have left multiple messages.

## 2019-05-16 ENCOUNTER — HOSPITAL ENCOUNTER (OUTPATIENT)
Dept: CARDIOLOGY | Facility: CLINIC | Age: 74
Discharge: HOME | End: 2019-05-16
Payer: MEDICARE

## 2019-05-16 ENCOUNTER — INITIAL CONSULT (OUTPATIENT)
Dept: INTERNAL MEDICINE | Facility: CLINIC | Age: 74
End: 2019-05-16
Payer: MEDICARE

## 2019-05-16 ENCOUNTER — HOSPITAL ENCOUNTER (OUTPATIENT)
Dept: PREADMISSION TESTING | Facility: HOSPITAL | Age: 74
Discharge: HOME | End: 2019-05-16
Attending: ANESTHESIOLOGY
Payer: MEDICARE

## 2019-05-16 ENCOUNTER — OFFICE VISIT (OUTPATIENT)
Dept: GYNECOLOGIC ONCOLOGY | Facility: CLINIC | Age: 74
End: 2019-05-16
Payer: MEDICARE

## 2019-05-16 VITALS
WEIGHT: 197.75 LBS | HEART RATE: 72 BPM | BODY MASS INDEX: 29.97 KG/M2 | SYSTOLIC BLOOD PRESSURE: 130 MMHG | DIASTOLIC BLOOD PRESSURE: 66 MMHG | HEIGHT: 68 IN

## 2019-05-16 VITALS
HEIGHT: 68 IN | HEART RATE: 89 BPM | OXYGEN SATURATION: 96 % | SYSTOLIC BLOOD PRESSURE: 127 MMHG | WEIGHT: 205 LBS | DIASTOLIC BLOOD PRESSURE: 71 MMHG | BODY MASS INDEX: 31.07 KG/M2 | TEMPERATURE: 98 F | RESPIRATION RATE: 16 BRPM

## 2019-05-16 DIAGNOSIS — G60.9 IDIOPATHIC PERIPHERAL NEUROPATHY: ICD-10-CM

## 2019-05-16 DIAGNOSIS — N28.9 RENAL IMPAIRMENT: ICD-10-CM

## 2019-05-16 DIAGNOSIS — R73.03 PREDIABETES: ICD-10-CM

## 2019-05-16 DIAGNOSIS — I10 BENIGN ESSENTIAL HYPERTENSION: ICD-10-CM

## 2019-05-16 DIAGNOSIS — I48.91 ATRIAL FIBRILLATION, UNSPECIFIED TYPE: ICD-10-CM

## 2019-05-16 DIAGNOSIS — R60.9 EDEMA, UNSPECIFIED TYPE: ICD-10-CM

## 2019-05-16 DIAGNOSIS — C43.59 MALIGNANT MELANOMA OF TORSO EXCLUDING BREAST: ICD-10-CM

## 2019-05-16 DIAGNOSIS — M48.061 SPINAL STENOSIS OF LUMBAR REGION, UNSPECIFIED WHETHER NEUROGENIC CLAUDICATION PRESENT: ICD-10-CM

## 2019-05-16 DIAGNOSIS — K21.9 GASTROESOPHAGEAL REFLUX DISEASE, ESOPHAGITIS PRESENCE NOT SPECIFIED: ICD-10-CM

## 2019-05-16 DIAGNOSIS — L98.9 SKIN LESION: ICD-10-CM

## 2019-05-16 DIAGNOSIS — Z01.818 PREOP EXAMINATION: Primary | ICD-10-CM

## 2019-05-16 DIAGNOSIS — N39.41 URGE INCONTINENCE OF URINE: ICD-10-CM

## 2019-05-16 DIAGNOSIS — E78.5 DYSLIPIDEMIA: ICD-10-CM

## 2019-05-16 DIAGNOSIS — Z01.818 PRE-OP TESTING: ICD-10-CM

## 2019-05-16 DIAGNOSIS — Z87.09 HISTORY OF BRONCHITIS: ICD-10-CM

## 2019-05-16 DIAGNOSIS — C43.59 MALIGNANT MELANOMA OF TORSO EXCLUDING BREAST: Primary | ICD-10-CM

## 2019-05-16 PROBLEM — R73.9 HYPERGLYCEMIA: Status: ACTIVE | Noted: 2019-05-16

## 2019-05-16 PROBLEM — M25.559 HIP PAIN: Status: RESOLVED | Noted: 2019-03-15 | Resolved: 2019-05-16

## 2019-05-16 PROCEDURE — 1101F PR PT FALLS ASSESS DOC 0-1 FALLS W/OUT INJ PAST YR: ICD-10-PCS | Mod: CPTII,S$GLB,, | Performed by: HOSPITALIST

## 2019-05-16 PROCEDURE — 99214 OFFICE O/P EST MOD 30 MIN: CPT | Mod: S$GLB,,, | Performed by: HOSPITALIST

## 2019-05-16 PROCEDURE — 99999 PR PBB SHADOW E&M-EST. PATIENT-LVL I: ICD-10-PCS | Mod: PBBFAC,,, | Performed by: HOSPITALIST

## 2019-05-16 PROCEDURE — 99214 PR OFFICE/OUTPT VISIT, EST, LEVL IV, 30-39 MIN: ICD-10-PCS | Mod: S$GLB,,, | Performed by: HOSPITALIST

## 2019-05-16 PROCEDURE — 99999 PR PBB SHADOW E&M-EST. PATIENT-LVL IV: CPT | Mod: PBBFAC,,, | Performed by: OBSTETRICS & GYNECOLOGY

## 2019-05-16 PROCEDURE — 3078F DIAST BP <80 MM HG: CPT | Mod: CPTII,S$GLB,, | Performed by: OBSTETRICS & GYNECOLOGY

## 2019-05-16 PROCEDURE — 3075F PR MOST RECENT SYSTOLIC BLOOD PRESS GE 130-139MM HG: ICD-10-PCS | Mod: CPTII,S$GLB,, | Performed by: OBSTETRICS & GYNECOLOGY

## 2019-05-16 PROCEDURE — 1101F PT FALLS ASSESS-DOCD LE1/YR: CPT | Mod: CPTII,S$GLB,, | Performed by: HOSPITALIST

## 2019-05-16 PROCEDURE — 3074F SYST BP LT 130 MM HG: CPT | Mod: CPTII,S$GLB,, | Performed by: HOSPITALIST

## 2019-05-16 PROCEDURE — 93010 EKG 12-LEAD: ICD-10-PCS | Mod: S$GLB,,, | Performed by: INTERNAL MEDICINE

## 2019-05-16 PROCEDURE — 93010 ELECTROCARDIOGRAM REPORT: CPT | Mod: S$GLB,,, | Performed by: INTERNAL MEDICINE

## 2019-05-16 PROCEDURE — 3074F PR MOST RECENT SYSTOLIC BLOOD PRESSURE < 130 MM HG: ICD-10-PCS | Mod: CPTII,S$GLB,, | Performed by: HOSPITALIST

## 2019-05-16 PROCEDURE — 99999 PR PBB SHADOW E&M-EST. PATIENT-LVL I: CPT | Mod: PBBFAC,,, | Performed by: HOSPITALIST

## 2019-05-16 PROCEDURE — 99214 OFFICE O/P EST MOD 30 MIN: CPT | Mod: S$GLB,,, | Performed by: OBSTETRICS & GYNECOLOGY

## 2019-05-16 PROCEDURE — 1101F PT FALLS ASSESS-DOCD LE1/YR: CPT | Mod: CPTII,S$GLB,, | Performed by: OBSTETRICS & GYNECOLOGY

## 2019-05-16 PROCEDURE — 3078F DIAST BP <80 MM HG: CPT | Mod: CPTII,S$GLB,, | Performed by: HOSPITALIST

## 2019-05-16 PROCEDURE — 3078F PR MOST RECENT DIASTOLIC BLOOD PRESSURE < 80 MM HG: ICD-10-PCS | Mod: CPTII,S$GLB,, | Performed by: OBSTETRICS & GYNECOLOGY

## 2019-05-16 PROCEDURE — 99999 PR PBB SHADOW E&M-EST. PATIENT-LVL IV: ICD-10-PCS | Mod: PBBFAC,,, | Performed by: OBSTETRICS & GYNECOLOGY

## 2019-05-16 PROCEDURE — 3078F PR MOST RECENT DIASTOLIC BLOOD PRESSURE < 80 MM HG: ICD-10-PCS | Mod: CPTII,S$GLB,, | Performed by: HOSPITALIST

## 2019-05-16 PROCEDURE — 3075F SYST BP GE 130 - 139MM HG: CPT | Mod: CPTII,S$GLB,, | Performed by: OBSTETRICS & GYNECOLOGY

## 2019-05-16 PROCEDURE — 1101F PR PT FALLS ASSESS DOC 0-1 FALLS W/OUT INJ PAST YR: ICD-10-PCS | Mod: CPTII,S$GLB,, | Performed by: OBSTETRICS & GYNECOLOGY

## 2019-05-16 PROCEDURE — 93005 EKG 12-LEAD: ICD-10-PCS | Mod: S$GLB,,, | Performed by: ANESTHESIOLOGY

## 2019-05-16 PROCEDURE — 93005 ELECTROCARDIOGRAM TRACING: CPT | Mod: S$GLB,,, | Performed by: ANESTHESIOLOGY

## 2019-05-16 PROCEDURE — 99214 PR OFFICE/OUTPT VISIT, EST, LEVL IV, 30-39 MIN: ICD-10-PCS | Mod: S$GLB,,, | Performed by: OBSTETRICS & GYNECOLOGY

## 2019-05-16 RX ORDER — LIDOCAINE 50 MG/G
1 PATCH TOPICAL
COMMUNITY
End: 2019-09-11

## 2019-05-16 RX ORDER — LIDOCAINE HYDROCHLORIDE 10 MG/ML
1 INJECTION, SOLUTION EPIDURAL; INFILTRATION; INTRACAUDAL; PERINEURAL ONCE
Status: CANCELLED | OUTPATIENT
Start: 2019-05-16 | End: 2019-05-16

## 2019-05-16 RX ORDER — DILTIAZEM HYDROCHLORIDE 240 MG/1
240 CAPSULE, COATED, EXTENDED RELEASE ORAL DAILY
COMMUNITY
End: 2023-09-27

## 2019-05-16 RX ORDER — CETIRIZINE HYDROCHLORIDE 10 MG/1
10 TABLET ORAL DAILY
COMMUNITY

## 2019-05-16 RX ORDER — MUPIROCIN 20 MG/G
OINTMENT TOPICAL
Status: CANCELLED | OUTPATIENT
Start: 2019-05-16

## 2019-05-16 RX ORDER — POTASSIUM CHLORIDE 750 MG/1
10 CAPSULE, EXTENDED RELEASE ORAL DAILY
COMMUNITY

## 2019-05-16 RX ORDER — ESOMEPRAZOLE MAGNESIUM 40 MG/1
40 CAPSULE, DELAYED RELEASE ORAL
COMMUNITY
End: 2020-09-24 | Stop reason: ALTCHOICE

## 2019-05-16 RX ORDER — ALBUTEROL SULFATE 90 UG/1
2 AEROSOL, METERED RESPIRATORY (INHALATION) EVERY 6 HOURS PRN
COMMUNITY

## 2019-05-16 NOTE — ASSESSMENT & PLAN NOTE
Uses Lidocaine patch as needed for back ache    I suggest that the perioperative team be aware of this

## 2019-05-16 NOTE — DISCHARGE INSTRUCTIONS
Your surgery has been scheduled for:__________________________________________    You should report to:  ____[facility], located on the [place] side of the first floor of the           [facility] ([phone])  ____[facility], located on the [facility] side of the            Second floor of the [facility] ([phone])  ____3rd Floor [facility] located on the [facility] side of the [facility] [phone]  Please Note   - Tell your doctor if you take Aspirin, products containing Aspirin, herbal medications  or blood thinners, such as Coumadin, Ticlid, or Plavix.  (Consult your provider regarding holding or stopping before surgery).  - Arrange for someone to drive you home following surgery.  You will not be allowed to leave the surgical facility alone or drive yourself home following sedation and anesthesia.  Before Surgery  - Stop taking all herbal medications 14days prior to surgery  - No Motrin/Advil (Ibuprofen) 7 days before surgery  - No Aleve (Naproxen) 7 days before surgery  - Stop Taking Asprin, products containing Asprin _____days before surgery  - Stop taking blood thinners_______days before surgery  - No Goody's/BC  Powder 7 days before surgery  - Refrain from drinking alcoholic beverages for 24hours before and after surgery  - Stop or limit smoking _________days before surgery  - You may take Tylenol for pain  Night before Surgery  Stop ALL solid food, gum, candy (including vitamins) 8 hours before arrival time.  (Please note: If your surgeon gives you different eating and drinking instructions, please follow surgeon's directions.)  Stop all CLOUDY liquids: coffee with creamer, formula, tube feeds, cloudy juices, non-human milk and breast milk with additives, 6 hours prior to arrival time.  Stop plain breast milk 4 hours prior to arrival time.  The patient should be ENCOURAGED to drink carbohydrate-rich  clear liquids (sports drinks, clear juices) until 2 hours prior to arrival time.  CLEAR liquids include only water, black coffee NO creamer, clear oral rehydration drinks, clear sports drinks or clear fruit juices (no orange juice, no pulpy juices, no apple cider). Advise patients if they can read newsprint through the liquid, it qualifies as clear liquid.   IF IN DOUBT, drink water instead.   - Take a shower or bath (shower is recommended).  Bathe with Hibiclens soap or an antibacterial soap from the neck down.  If not supplied by your surgeon, hibiclens soap will need to be purchased over the counter in pharmacy.  Rinse soap off thoroughly.  - Shampoo your hair with your regular shampoo  The Day of Surgery  · NOTHING TO  DRINK 2 hours before arrival time. If you are told to take medication on the morning of surgery, it may be taken with a sip of water.   - Take another bath or shower with hibiclens or any antibacterial soap, to reduce the chance of infection.  - Take heart and blood pressure medications with a small sip of water, as advised by the perioperative team.  - Do not take fluid pills  - You may brush your teeth and rinse your mouth, but do not swall any additional water.   - Do not apply perfumes, powder, body lotions or deodorant on the day of surgery.  - Nail polish should be removed.  - Do not wear makeup or moisturizer  - Wear comfortable clothes, such as a button front shirt and loose fitting pants.  - Leave all jewelry, including body piercings, and valuables at home.    - Bring any devices you will neeed after surgery such as crutches or canes.  - If you have sleep apnea, please bring your CPAP machine  In the event that your physical condition changes including the onset of a cold or respiratory illness, or if you have to delay or cancel your surgery, please notify your surgeon.  Anesthesia: Regional Anesthesia    Youre scheduled for surgery. During surgery, youll receive medicine called  anesthesia to keep you comfortable and pain-free. Your surgeon has decided that youll receive regional anesthesia. This sheet tells you what to expect with this type of anesthesia.  What is regional anesthesia?  Regional anesthesia numbs one region of your body. The anesthesia may be given around nerves or into veins in your arms, neck, or legs (nerve block or Leobardo block). Or it may be sent into the spinal fluid (spinal anesthesia) or into the space just outside the spinal fluid (epidural anesthesia). You may also be given sedatives to help you relax.  Nerve block or McKinnon block  A small area of the body, such as an arm or leg, can be numbed using a nerve block or Leobardo block.  · Nerve block. During a nerve block, your skin is numbed. A needle is then inserted near nerves that serve the area to be numbed. Anesthetic is sent through the needle.  · IV regional or McKinnon block. For this type of block, an IV line is put into a vein. The blood flow to the area to be numbed is blocked for a short time. Anesthetic is sent through the IV.  Spinal anesthesia  Spinal anesthesia numbs your body from about the waist down.  · Anesthetic is injected into the spinal fluid. This is a substance that surrounds the spinal cord in your spinal column. The anesthetic blocks pain traveling from the body to the brain.  · To receive the anesthetic, your skin is numbed at the injection site on your back.  · A needle is then inserted into the spinal space. Anesthetic is sent into the spinal fluid through the needle.  Epidural anesthesia  Epidural anesthesia is most commonly used during childbirth and may also be used after surgical procedures of the chest, belly, and legs.  · Anesthetic is injected into the epidural space. This is just outside the dural sac which contains the spinal fluid.  · To receive the anesthetic, your skin is numbed at the injection site on your back.  · A needle is then inserted into the epidural space. Anesthetic is sent  into the epidural space through the needle.  · A small flexible catheter may be attached to the needle and left in place. This allows for continuous injections or infusions of anesthetic.  Anesthesia tools and medicines that might be near you during your procedure  · Local anesthetic. This medicine is given through a needle numbs one region of your body.  · Electrocardiography leads (electrodes). These are used to record your heart rate and rhythm.  · Blood pressure cuff. A cuff is placed on your arm to keep track of your blood pressure.  · Pulse oximeter. This small clip is placed on the end of the finger. It measures your blood oxygen level.  · Sedatives. These medicines may be given through an IV. They help to relax you and keep you comfortable. You may stay awake or sleep lightly.  · Oxygen. You may be given oxygen through a facemask.  Risks and possible complications  Regional anesthesia carries some risks. These include:  · Nausea and vomiting  · Headache  · Backache  · Decreased blood pressure  · Allergic reaction to the anesthetic  · Ongoing numbness (rare)  · Irregular heartbeat (rare)  · Cardiac arrest (rare)   Date Last Reviewed: 12/1/2016 © 2000-2017 [employer]. [address] [place] [address]. All rights reserved. This information is not intended as a substitute for professional medical care. Always follow your healthcare professional's instructions.    Anesthesia: General Anesthesia     You are watched continuously during your procedure by your anesthesia provider.     Youre due to have surgery. During surgery, youll be given medicine called anesthesia or anesthetic. This will keep you comfortable and pain-free. Your anesthesia provider will use general anesthesia.  What is general anesthesia?  General anesthesia puts you into a state like deep sleep. It goes into the bloodstream (IV anesthetics), into the lungs (gas anesthetics), or both. You feel nothing during the  procedure. You will not remember it. During the procedure, the anesthesia provider monitors you continuously. He or she checks your heart rate and rhythm, blood pressure, breathing, and blood oxygen.  · IV anesthetics. IV anesthetics are given through an IV line in your arm. Theyre often given first. This is so you are asleep before a gas anesthetic is started. Some kinds of IV anesthetics relieve pain. Others relax you. Your doctor will decide which kind is best in your case.  · Gas anesthetics. Gas anesthetics are breathed into the lungs. They are often used to keep you asleep. They can be given through a facemask or a tube placed in your larynx or trachea (breathing tube).  ? If you have a facemask, your anesthesia provider will most likely place it over your nose and mouth while youre still awake. Youll breathe oxygen through the mask as your IV anesthetic is started. Gas anesthetic may be added through the mask.  ? If you have a tube in the larynx or trachea, it will be inserted into your throat after youre asleep.  Anesthesia tools and medicines  You will likely have:  · IV anesthetics. These are put into an IV line into your bloodstream.  · Gas anesthetics. You breathe these anesthetics into your lungs, where they pass into your bloodstream.  · Pulse oximeter. This is a small clip that is attached to the end of your finger. This measures your blood oxygen level.  · Electrocardiography leads (electrodes). These are small sticky pads that are placed on your chest. They record your heart rate and rhythm.  · Blood pressure cuff. This reads your blood pressure.  Risks and possible complications  General anesthesia has some risks. These include:  · Breathing problems  · Nausea and vomiting  · Sore throat or hoarseness (usually temporary)  · Allergic reaction to the anesthetic  · Irregular heartbeat (rare)  · Cardiac arrest (rare)   Anesthesia safety  · Follow all instructions you are given for how long not to  eat or drink before your procedure.  · Be sure your doctor knows what medicines and drugs you take. This includes over-the-counter medicines, herbs, supplements, alcohol or other drugs. You will be asked when those were last taken.  · Have an adult family member or friend drive you home after the procedure.  · For the first 24 hours after your surgery:  ? Do not drive or use heavy equipment.  ? Do not make important decisions or sign legal documents. If important decisions or signing legal documents is necessary during the first 24 hours after surgery, have a trusted family member or spouse act on your behalf.  ? Avoid alcohol.  ? Have a responsible adult stay with you. He or she can watch for problems and help keep you safe.  Date Last Reviewed: 12/1/2016 © 2000-2017 [employer]. [address] [place] [address], [place] [address]. All rights reserved. This information is not intended as a substitute for professional medical care. Always follow your healthcare professional's instructions

## 2019-05-16 NOTE — ASSESSMENT & PLAN NOTE
3/16/2019   Check A1c   She checks her capillary glucose - 95- 99   She likely has pre diabetes  ---  5/19--8 18     A1c - 6.2 from 5/17 , in the prediabetic range   Prediabetes- I suggest monitoring the glucose in the perioperative period as  glucose may be high from stress hyperglycemia in which case Insulin may be required    Hemoglobin A1c in the pre diabetic range   Weight loss with diet and exercise , if able helps lower A1c  Increased risk of becoming a diabetic   Needs follow up

## 2019-05-16 NOTE — ASSESSMENT & PLAN NOTE
Left fore arm   Blacking pigment   Had it for many years   Had Dermatology evaluation   Suggested follow up

## 2019-05-16 NOTE — H&P (VIEW-ONLY)
Subjective:       Patient ID: Bonny Luciano is a 73 y.o. female.    Chief Complaint: malignant melanoma of torso excluding breast and consents (sign surgery consents)    HPI   Patient comes in today to sign consents for an exam under anesthesia with additional vulvar biopsies for diagnosis of melanoma in situ of the vulva.  This is an extensive lesion that extends into the vagina.      Treatment history:    March 2019:  Patient referred to me for vulvar melanoma.  A biopsy had shown atypical junctional melanocytic proliferation by the referring physician.  Patient has extensive melanocytic changes to the vulva and vagina.    Biopsy was performed by me and shows melanoma in situ.  CT of chest, abdomen and pelvis shows several pulmonary micronodules, nonspecific.  No adenopathy.    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 4/17/2019     Past Surgical History:   Procedure Laterality Date    HAND SURGERY      HYSTERECTOMY  1993     Family History   Problem Relation Age of Onset    Diabetes Mother      Social History     Tobacco Use    Smoking status: Never Smoker    Smokeless tobacco: Never Used   Substance Use Topics    Alcohol use: Yes     Frequency: 2-4 times a month     Comment: X mas, New year, Birthday    Drug use: No     Review of patient's allergies indicates:  No Known Allergies   Current Outpatient Medications:     albuterol (PROAIR HFA) 90 mcg/actuation inhaler, Inhale 2 puffs into the lungs every 6 (six) hours as needed for Wheezing. Rescue, Disp: , Rfl:     albuterol (PROVENTIL) 2.5 mg /3 mL (0.083 %) nebulizer solution, Take 2.5 mg by nebulization as needed. , Disp: , Rfl: 0    apixaban (ELIQUIS ORAL), Take 5 mg by mouth 2  (two) times daily. , Disp: , Rfl:     atorvastatin (LIPITOR) 40 MG tablet, atorvastatin 40 mg tablet  Take 1 tablet every day by oral route., Disp: , Rfl:     cetirizine (ZYRTEC) 10 MG tablet, Take 10 mg by mouth once daily., Disp: , Rfl:     diltiaZEM (CARDIZEM CD) 240 MG 24 hr capsule, Take 240 mg by mouth once daily., Disp: , Rfl:     esomeprazole (NEXIUM) 40 MG capsule, Take 40 mg by mouth before breakfast., Disp: , Rfl:     fluticasone-vilanterol (BREO ELLIPTA) 100-25 mcg/dose diskus inhaler, Inhale 1 puff into the lungs once daily. Controller, Disp: 60 each, Rfl: 6    fluticasone-vilanterol (BREO ELLIPTA) 100-25 mcg/dose diskus inhaler, Inhale 1 puff into the lungs once daily. Controller, Disp: 60 each, Rfl: 6    gabapentin (NEURONTIN) 300 MG capsule, gabapentin 300 mg capsule  TAKE ONE CAPSULE BY MOUTH THREE TIMES DAILY THEREAFTER, Disp: , Rfl:     hydroCHLOROthiazide (HYDRODIURIL) 25 MG tablet, hydrochlorothiazide 25 mg tablet  Take 1 tablet every day by oral route., Disp: , Rfl:     lidocaine (LIDODERM) 5 %, Place 1 patch onto the skin as needed (back pain). Remove & Discard patch within 12 hours or as directed by MD, Disp: , Rfl:     oxybutynin (DITROPAN-XL) 10 MG 24 hr tablet, Take 1 tablet (10 mg total) by mouth once daily., Disp: 30 tablet, Rfl: 11    potassium chloride (MICRO-K) 10 MEQ CpSR, Take 10 mEq by mouth once daily. , Disp: , Rfl:     ranitidine (ZANTAC) 150 MG capsule, ranitidine 150 mg capsule  Take 1 capsule twice a day by oral route.- as needed for acid reflux, Disp: , Rfl:       Review of Systems   Constitutional: Negative for chills, fatigue and fever.   Respiratory: Negative for cough, shortness of breath and wheezing.    Cardiovascular: Negative for chest pain, palpitations and leg swelling.   Gastrointestinal: Negative for abdominal pain, constipation, diarrhea, nausea and vomiting.   Genitourinary: Negative for difficulty urinating, dysuria, frequency, genital sores,  "hematuria, urgency, vaginal bleeding, vaginal discharge and vaginal pain.   Neurological: Negative for weakness.   Hematological: Negative for adenopathy. Does not bruise/bleed easily.   Psychiatric/Behavioral: The patient is not nervous/anxious.        Objective:   /66   Pulse 72   Ht 5' 8" (1.727 m)   Wt 89.7 kg (197 lb 12 oz)   BMI 30.07 kg/m²      Physical Exam   Constitutional: She is oriented to person, place, and time. She appears well-developed and well-nourished.   HENT:   Head: Normocephalic and atraumatic.   Eyes: No scleral icterus.   Neck: No tracheal deviation present. No thyromegaly present.   Cardiovascular: Normal rate and regular rhythm.   Pulmonary/Chest: Effort normal and breath sounds normal.   Abdominal: Soft. She exhibits no distension and no mass. There is no tenderness. There is no rebound and no guarding. No hernia.   Genitourinary:   Genitourinary Comments: Not performed.    Musculoskeletal: She exhibits no edema or tenderness.   Lymphadenopathy:     She has no cervical adenopathy.   Neurological: She is alert and oriented to person, place, and time.   Skin: Skin is warm and dry.   Psychiatric: She has a normal mood and affect. Her behavior is normal. Judgment and thought content normal.       Assessment:       1. Malignant melanoma of torso excluding breast        Plan:   Malignant melanoma of torso excluding breast  Melanoma in situ on office biopsy.  Plans for exam under anesthesia multiple biopsies to exclude an invasive melanoma.    Consent forms were reviewed with patient. Questions were answered. Patient voiced understanding. Consents were signed.  Preop orders placed.       "

## 2019-05-16 NOTE — ASSESSMENT & PLAN NOTE
Had dysphagia in Jan 2019   Taking Nexium since and no longer having dysphagia   GERDSymptoms -acid taste to the throat   GERD-  I suggest continuation of the Proton pump inhibitor in the perioperative period . I suggest aspiration precautions  Taking Ranitidine as needed ( not requiring much )

## 2019-05-16 NOTE — ASSESSMENT & PLAN NOTE
Since 2006   Had Cardioversion twice   No recent problem   On anticoagulation with  Apixaban  Given her pulmonary status , being considered for spinal   Suggested 3 day pre op hold   Procedure is on 5/20   Last dose pre op today 5/16/2019   Suggest Cardiac monitoring

## 2019-05-16 NOTE — OUTPATIENT SUBJECTIVE & OBJECTIVE
Outpatient Subjective & Objective     Chief complaint-Preoperative evaluation, Perioperative Medical management, complication reduction plan     Active cardiac conditions- none    Revised cardiac risk index predictors- none    Functional capacity -Examples of physical activity , as noted ,  house work, can take 1 flight of stairs and weeding garden----- She can undertake all the above activities without  chest pain,chest tightness, Shortness of breath ,dizziness,lightheadedness making her exercise tolerance more  than 4 Mets.       Review of Systems   Constitutional: Negative for chills and fever.        No unusual weight changes     HENT:        STOPBANG score  3/ 8    HTN  Age over 50   Neck size over 40 CM     Eyes:        No new visual changes   Respiratory:        No cough , phlegm  No Hemoptysis   Cardiovascular:        As noted   Gastrointestinal:        No overt GI/ blood losses  Bowel movements- Regular   Endocrine:        Prednisone use > 20 mg daily for 3 weeks- none    Genitourinary: Negative for dysuria.        No urinary hesitancy    Musculoskeletal:        As above      Skin: Negative for rash.   Neurological: Negative for syncope.        No unilateral weakness   Hematological:        Current use of Anticoagulants     Psychiatric/Behavioral:        No Depression,Anxiety     No vascular stenting     No past medical history pertinent negatives.        No anesthesia, bleeding, cardiac problems, PONV  with previous surgeries/procedures.  Medications and Allergies reviewed in epic.   FH- No anesthesia,bleeding / venous thrombosis , early onset heart disease in family   Help available post op   Physical Exam      Physical Exam  Constitutional- General appearance-Conscious,Coherent  Eyes- No conjunctival icterus,pupils  round  and reactive to light   ENT-Oral cavity- moist  , Hearing grossly normal   Neck- No thyromegaly ,Trachea -central, No jugular venous distension,   No Carotid Bruit   Cardiovascular  -Heart Sounds-controlled afib  and  no murmur   , No gallop rhythm   Respiratory - Normal Respiratory Effort, Normal breath sounds, crepitations bases,  no wheeze  and  no forced expiratory wheeze    Peripheral pitting pedal edema-- mild, no calf pain   Gastrointestinal -Soft abdomen, No palpable masses, Non Tender,Liver,Spleen not palpable. No-- free fluid and shifting dullness  Musculoskeletal- No finger Clubbing. Strength grossly normal   Lymphatic-No Palpable cervical, axillary,Inguinal lymphadenopathy   Psychiatric - normal effect,Orientation  Rt Dorsalis pedis pulses-palpable    Lt Dorsalis pedis pulses- palpable   Rt Posterior tibial pulses -palpable   Left posterior tibial pulses -palpable   Miscellaneous -  no renal bruit  Investigations  Lab and Imaging have been reviewed in epic.      Review of Medicine tests    EKG- I had independently reviewed the EKG from--today - A fib    Review of clinical lab tests:  Lab Results   Component Value Date    CREATININE 1.0 03/26/2019    HGB 13.4 05/16/2019     05/16/2019         Review of old records- Was done and information gathered regards to events leading to surgery and health conditions of significance in the perioperative period.    Outpatient Subjective & Objective

## 2019-05-16 NOTE — ASSESSMENT & PLAN NOTE
Diltiazem  HCTZ  Home BP readings -120-140/60-70   Recent BP readings in the record-120/70's  Hypertension-  Blood pressure is acceptable . I suggest continuation of   Diltiazem- during the entire perioperative period. I suggest holding HCTZ on the morning of the surgery and can continue that  post operatively under blood pressure, electrolyte and renal function monitoring as long as they are acceptable.I suggest addressing pain control as uncontrolled pain can increased blood pressure

## 2019-05-16 NOTE — ASSESSMENT & PLAN NOTE
Edema- I suggested avoidance of added salt,avoidance of NSAID's, unless advised or ordered  and suggested Limb elevation and alecia hose use

## 2019-05-16 NOTE — HPI
History of present illness- I had the pleasure of meeting this pleasant 73 y.o. lady in the pre op clinic prior to her elective Gynecological surgery. The patient is new to me .  Was accompanied by  Estiven . Goes by Kacey.    I have obtained the history by speaking to the patient and by reviewing the electronic health records.    Events leading up to surgery / History of presenting illness -    Started with discoloration of Vulva about 1 year ago . Did not have any discomfort or pain     Was wearing pads for urinary incontinence and initially attributed the color change to the pads. Sought attention as the discoloration was getting darker     No vaginal bleeding      No pain from this .No aggravating factors. No relieving factors     Relevant health conditions of significance for the perioperative period/ History of presenting illness -    Health conditions of significance for the perioperative period - HTN, A fib, anticoagulation , Bronchitis , Acid reflux        [facility] with  [facility] [place] or [place] based on severity     Works as a [employer] [place]   Physically active   Does yard work, Can take a flight of stairs      Not known to have CAD , Diabetes Mellitus

## 2019-05-16 NOTE — ASSESSMENT & PLAN NOTE
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 I suggest consideration of inhaled bronchodilator use if the patient has perioperative bronchospasm

## 2019-05-16 NOTE — LETTER
May 16, 2019      Mik Cruz MD  [address] [facility] [address] [facility] [address]           [facility] - Pre Op Consult  [address] [facility] [address] [facility] [address]  Phone: [phone]          Patient: Bonny Luciano   MR Number: [id number]   YOB: 1945   Date of Visit: 5/16/2019       Dear Dr. Mik Cruz:    Thank you for referring Bonny Luciano to me for evaluation. Attached you will find relevant portions of my assessment and plan of care.    If you have questions, please do not hesitate to call me. I look forward to following Bonny Luciano along with you.    Sincerely,    Gertrudis Day MD    Enclosure  CC:  No Recipients    If you would like to receive this communication electronically, please contact [email or web address] or [phone] to request more information on [employer] Link access.    For providers and/or their staff who would like to refer a patient to [facility], please contact us through our one-stop-shop provider referral line, [facility], at [phone].    If you feel you have received this communication in error or would no longer like to receive these types of communications, please e-mail [email or web address]

## 2019-05-16 NOTE — PROGRESS NOTES
[facility] - Pre Op Consult  Progress Note    Patient Name: Bonny Luciano  MRN: [id number]  Date of Evaluation- 05/19/2019  PCP- Segun Atkins NP    Future cases for Bonny Luciano [[id number]]     Case ID Status Date Time [place] Procedure Provider Location    [id number] [facility] 5/20/2019  9:50 AM 90 BIOPSY, VULVA Mik Cruz MD [[id number]] [facility] OR 2ND FLR          HPI:  History of present illness- I had the pleasure of meeting this pleasant 73 y.o. lady in the pre op clinic prior to her elective Gynecological surgery. The patient is new to me .  Was accompanied by  Estiven . Goes by Kacey.    I have obtained the history by speaking to the patient and by reviewing the electronic health records.    Events leading up to surgery / History of presenting illness -    Started with discoloration of Vulva about 1 year ago . Did not have any discomfort or pain     Was wearing pads for urinary incontinence and initially attributed the color change to the pads. Sought attention as the discoloration was getting darker     No vaginal bleeding      No pain from this .No aggravating factors. No relieving factors     Relevant health conditions of significance for the perioperative period/ History of presenting illness -    Health conditions of significance for the perioperative period - HTN, A fib, anticoagulation , Bronchitis , Acid reflux        [facility] [place] or [place] based on severity     Works as a [employer]   Physically active   Does yard work, Can take a flight of stairs      Not known to have CAD , Diabetes Mellitus         Subjective/ Objective:          Chief complaint-Preoperative evaluation, Perioperative Medical management, complication reduction plan     Active cardiac conditions- none    Revised cardiac risk index predictors- none    Functional capacity -Examples of physical activity , as noted ,  house work, can take 1 flight of stairs and weeding garden----- She  can undertake all the above activities without  chest pain,chest tightness, Shortness of breath ,dizziness,lightheadedness making her exercise tolerance more  than 4 Mets.       Review of Systems   Constitutional: Negative for chills and fever.        No unusual weight changes     HENT:        STOPBANG score  3/ 8    HTN  Age over 50   Neck size over 40 CM     Eyes:        No new visual changes   Respiratory:        No cough , phlegm  No Hemoptysis   Cardiovascular:        As noted   Gastrointestinal:        No overt GI/ blood losses  Bowel movements- Regular   Endocrine:        Prednisone use > 20 mg daily for 3 weeks- none    Genitourinary: Negative for dysuria.        No urinary hesitancy    Musculoskeletal:        As above      Skin: Negative for rash.   Neurological: Negative for syncope.        No unilateral weakness   Hematological:        Current use of Anticoagulants     Psychiatric/Behavioral:        No Depression,Anxiety     No vascular stenting     No past medical history pertinent negatives.        No anesthesia, bleeding, cardiac problems, PONV  with previous surgeries/procedures.  Medications and Allergies reviewed in epic.   FH- No anesthesia,bleeding / venous thrombosis , early onset heart disease in family   Help available post op   Physical Exam      Physical Exam  Constitutional- General appearance-Conscious,Coherent  Eyes- No conjunctival icterus,pupils  round  and reactive to light   ENT-Oral cavity- moist  , Hearing grossly normal   Neck- No thyromegaly ,Trachea -central, No jugular venous distension,   No Carotid Bruit   Cardiovascular -Heart Sounds-controlled afib  and  no murmur   , No gallop rhythm   Respiratory - Normal Respiratory Effort, Normal breath sounds, crepitations bases,  no wheeze  and  no forced expiratory wheeze    Peripheral pitting pedal edema-- mild, no calf pain   Gastrointestinal -Soft abdomen, No palpable masses, Non Tender,Liver,Spleen not palpable. No-- free fluid  and shifting dullness  Musculoskeletal- No finger Clubbing. Strength grossly normal   Lymphatic-No Palpable cervical, axillary,Inguinal lymphadenopathy   Psychiatric - normal effect,Orientation  Rt Dorsalis pedis pulses-palpable    Lt Dorsalis pedis pulses- palpable   Rt Posterior tibial pulses -palpable   Left posterior tibial pulses -palpable   Miscellaneous -  no renal bruit  Investigations  Lab and Imaging have been reviewed in epic.      Review of Medicine tests    EKG- I had independently reviewed the EKG from--today - A fib    Review of clinical lab tests:  Lab Results   Component Value Date    CREATININE 1.0 03/26/2019    HGB 13.4 05/16/2019     05/16/2019         Review of old records- Was done and information gathered regards to events leading to surgery and health conditions of significance in the perioperative period.        Preoperative cardiac risk assessment-  The patient does not have any active cardiac conditions . Revised cardiac risk index predictors- 0---.Functional capacity is more than 4 Mets. She will be undergoing a Gynecological procedure that carries a intermediate risk     Risk of a major Cardiac event ( Defined as death, myocardial infarction, or cardiac arrest at 30 days after noncardiac surgery), based on RCRI score     3.9%        No further cardiac work up is indicated prior to proceeding with the surgery          American Society of Anesthesiologists Physical status classification ( ASA ) class: 3     Postoperative pulmonary complication risk assessment:      ARISCAT ( [place]) risk index- risk class -  Low, if duration of surgery is under 3 hours, intermediate, if duration of surgery is over 3 hours          Assessment/Plan:     Benign essential hypertension  Diltiazem  HCTZ  Home BP readings -120-140/60-70   Recent BP readings in the record-120/70's  Hypertension-  Blood pressure is acceptable . I suggest continuation of   Diltiazem- during the entire perioperative period. I  suggest holding HCTZ on the morning of the surgery and can continue that  post operatively under blood pressure, electrolyte and renal function monitoring as long as they are acceptable.I suggest addressing pain control as uncontrolled pain can increased blood pressure     Atrial fibrillation  Since 2006   Had Cardioversion twice   No recent problem   On anticoagulation with  Apixaban  Given her pulmonary status , being considered for spinal   Suggested 3 day pre op hold   Procedure is on 5/20   Last dose pre op today 5/16/2019   Suggest Cardiac monitoring     Malignant melanoma of torso excluding breast          Gastroesophageal reflux disease  Had dysphagia in Jan 2019   Taking Nexium since and no longer having dysphagia   GERDSymptoms -acid taste to the throat   GERD-  I suggest continuation of the Proton pump inhibitor in the perioperative period . I suggest aspiration precautions  Taking Ranitidine as needed ( not requiring much )     Dyslipidemia  HLD-I  suggest continuation of statin during the entire perioperative period.    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 I suggest consideration of inhaled bronchodilator use if the patient has perioperative bronchospasm        Idiopathic peripheral neuropathy  Tingling of the feet   Feet care suggested   Not a diabetic     Spinal stenosis of lumbar region  Uses Lidocaine patch as needed for back ache    I suggest that the perioperative team be aware of this      Skin lesion  Left fore arm   Blacking pigment   Had it for many years   Had Dermatology evaluation   Suggested follow up     Renal impairment  3/26/2019  Unsure of base line    No chronic NSAID use   Deleterious effects NSAID's , Beneficial effects of Hydration discussed   Tylenol as needed for pain     I  suggest monitoring renal function, in put and out put status neymar-operatively. I  suggest avoiding nephrotoxic medication including NSAIDs, COX2 inhibitors, intravenous contrast agent,avoiding hypotension to prevent further renal impairment.     Prediabetes  3/16/2019   Check A1c   She checks her capillary glucose - 95- 99   She likely has pre diabetes  ---  5/19--8 18     A1c - 6.2 from 5/17 , in the prediabetic range   Prediabetes- I suggest monitoring the glucose in the perioperative period as  glucose may be high from stress hyperglycemia in which case Insulin may be required    Hemoglobin A1c in the pre diabetic range   Weight loss with diet and exercise , if able helps lower A1c  Increased risk of becoming a diabetic   Needs follow up         Edema  Edema- I suggested avoidance of added salt,avoidance of NSAID's, unless advised or ordered  and suggested Limb elevation and alecia hose use        Preventive perioperative care    Thromboembolic prophylaxis:  Her risk factors for thrombosis include obesity, surgical procedure and age.I suggest  thromboembolic prophylaxis ( mechanical/pharmacological, weighing the risk benefits of pharmacological agent use considering neymar procedural bleeding )  during the perioperative period.I suggested being active in the post operative period.      Postoperative pulmonary complication prophylaxis-Risk factors for post operative pulmonary complications include age over 65 years and ASA class >2- I suggest incentive spirometry use, early ambulation and end tidal carbon dioxide monitoring  , oral care , head end of bed elevation      Renal complication prophylaxis-Risk factors for renal complications include pre-existing renal disease and hypertension . I suggest keeping her well hydrated   She stays hydrated       Surgical site Infection Prophylaxis-I   suggest appropriate antibiotic for Prophylaxis against Surgical site infections      In view of gynecological procedure the patient  is at risk of postoperative urinary retention.  I suggest avoidance / minimizing the of  Benzodiazepines,Anticholinergic medication,antihistamines ( Benadryl) , if possible in the perioperative period. I suggest using the minimum possible use of opioids for the minimum period of time in the perioperative period. Benadryl avoidance suggested      This visit was focused on Preoperative evaluation, Perioperative Medical management, complication reduction plans. I suggest that the patient follows up with primary care or relevant sub specialists for ongoing health care.    I appreciate the opportunity to be involved in this patients care. Please feel free to contact me if there were any questions about this consultation.    Patient is optimized     Patient/ was instructed to call and update me about any changes to health,  medication, office visits ,testing out side of the neymar operative care center , hospitalizations between now and surgery     Gertrudis Day MD  Perioperative Medicine  [facility]   Pager [phone]  ----  5/16- 18 31    EKG report   Atrial fibrillation  Low QRS voltage in limb leads    No previous ECGs available    /71  Pulse 89   Temp 98.3   Sat 96 %   ---  5/16- 18 36     Requested base line Creatinine in the past 6-12 months from [facility]     ---  5/17- 17 19     Called to follow up , to address any concerns with the up coming surgery or any questions on Medication instructions   Unable to speak   Left a message to call, if needed   ----  5/19- 8 22    A1c noted     Pulmonary evaluation 5/15/2019         Asthma since childhood- felt to be symptomatically controlled     Pulmonary nodular disease  Noted plan for follow up CT    Called to discuss A1c  Left a message - about A1c  Call, if needed - since today is Sunday , left my cell phone  number in the message so that she can call, if she has any questions  --  5/21- 8 50     She called me about when she needs to restart Apixaban   No bleeding at the operation site /urine   No overt GI/ blood losses   Had surgery May 20 th   Discussed that usually -for high bleeding risk-  Resume 48 to 72 hours after surgery (ie, postoperative day 2 to 3)- how ever , suggested to check with surgeon     Call, if needed

## 2019-05-16 NOTE — ASSESSMENT & PLAN NOTE
3/26/2019  Unsure of base line   No chronic NSAID use   Deleterious effects NSAID's , Beneficial effects of Hydration discussed   Tylenol as needed for pain     I  suggest monitoring renal function, in put and out put status neymar-operatively. I  suggest avoiding nephrotoxic medication including NSAIDs, COX2 inhibitors, intravenous contrast agent,avoiding hypotension to prevent further renal impairment.

## 2019-05-19 PROBLEM — R73.03 PREDIABETES: Status: ACTIVE | Noted: 2019-05-16

## 2019-05-20 ENCOUNTER — ANESTHESIA (OUTPATIENT)
Dept: SURGERY | Facility: HOSPITAL | Age: 74
End: 2019-05-20
Payer: MEDICARE

## 2019-05-20 ENCOUNTER — HOSPITAL ENCOUNTER (OUTPATIENT)
Facility: HOSPITAL | Age: 74
Discharge: HOME | End: 2019-05-20
Attending: OBSTETRICS & GYNECOLOGY | Admitting: OBSTETRICS & GYNECOLOGY
Payer: MEDICARE

## 2019-05-20 VITALS
DIASTOLIC BLOOD PRESSURE: 59 MMHG | TEMPERATURE: 98 F | SYSTOLIC BLOOD PRESSURE: 123 MMHG | HEIGHT: 68 IN | WEIGHT: 205 LBS | BODY MASS INDEX: 31.07 KG/M2 | HEART RATE: 57 BPM | RESPIRATION RATE: 20 BRPM | OXYGEN SATURATION: 95 %

## 2019-05-20 DIAGNOSIS — C43.59 MALIGNANT MELANOMA OF TORSO EXCLUDING BREAST: ICD-10-CM

## 2019-05-20 DIAGNOSIS — C51.9 MELANOMA OF VULVA: Primary | ICD-10-CM

## 2019-05-20 PROCEDURE — D9220A PRA ANESTHESIA: ICD-10-PCS | Mod: ANES,,, | Performed by: ANESTHESIOLOGY

## 2019-05-20 PROCEDURE — D9220A PRA ANESTHESIA: Mod: CRNA,,, | Performed by: NURSE ANESTHETIST, CERTIFIED REGISTERED

## 2019-05-20 PROCEDURE — 37000009 HC ANESTHESIA EA ADD 15 MINS: Performed by: OBSTETRICS & GYNECOLOGY

## 2019-05-20 PROCEDURE — 71000016 HC POSTOP RECOV ADDL HR: Performed by: OBSTETRICS & GYNECOLOGY

## 2019-05-20 PROCEDURE — 27000221 HC OXYGEN, UP TO 24 HOURS

## 2019-05-20 PROCEDURE — 88342 TISSUE SPECIMEN TO PATHOLOGY - SURGERY: ICD-10-PCS | Mod: 26,,, | Performed by: PATHOLOGY

## 2019-05-20 PROCEDURE — 88341 PR IHC OR ICC EACH ADD'L SINGLE ANTIBODY  STAINPR: ICD-10-PCS | Mod: 26,,, | Performed by: PATHOLOGY

## 2019-05-20 PROCEDURE — 56605 BIOPSY OF VULVA/PERINEUM: CPT | Mod: ,,, | Performed by: OBSTETRICS & GYNECOLOGY

## 2019-05-20 PROCEDURE — 56605 PR BIOPSY VULVA/PERINEUM,ONE LESN: ICD-10-PCS | Mod: ,,, | Performed by: OBSTETRICS & GYNECOLOGY

## 2019-05-20 PROCEDURE — 88305 TISSUE EXAM BY PATHOLOGIST: CPT | Mod: 59 | Performed by: PATHOLOGY

## 2019-05-20 PROCEDURE — D9220A PRA ANESTHESIA: ICD-10-PCS | Mod: CRNA,,, | Performed by: NURSE ANESTHETIST, CERTIFIED REGISTERED

## 2019-05-20 PROCEDURE — 63600175 PHARM REV CODE 636 W HCPCS: Performed by: NURSE ANESTHETIST, CERTIFIED REGISTERED

## 2019-05-20 PROCEDURE — 88305 TISSUE EXAM BY PATHOLOGIST: CPT | Mod: 26,,, | Performed by: PATHOLOGY

## 2019-05-20 PROCEDURE — 88305 TISSUE SPECIMEN TO PATHOLOGY - SURGERY: ICD-10-PCS | Mod: 26,,, | Performed by: PATHOLOGY

## 2019-05-20 PROCEDURE — 36000707: Performed by: OBSTETRICS & GYNECOLOGY

## 2019-05-20 PROCEDURE — 88342 IMHCHEM/IMCYTCHM 1ST ANTB: CPT | Mod: 26,,, | Performed by: PATHOLOGY

## 2019-05-20 PROCEDURE — 88342 IMHCHEM/IMCYTCHM 1ST ANTB: CPT | Performed by: PATHOLOGY

## 2019-05-20 PROCEDURE — 71000039 HC RECOVERY, EACH ADD'L HOUR: Performed by: OBSTETRICS & GYNECOLOGY

## 2019-05-20 PROCEDURE — 25000003 PHARM REV CODE 250: Performed by: OBSTETRICS & GYNECOLOGY

## 2019-05-20 PROCEDURE — 36000706: Performed by: OBSTETRICS & GYNECOLOGY

## 2019-05-20 PROCEDURE — 88341 IMHCHEM/IMCYTCHM EA ADD ANTB: CPT | Mod: 26,,, | Performed by: PATHOLOGY

## 2019-05-20 PROCEDURE — 56606 BIOPSY OF VULVA/PERINEUM: CPT | Mod: ,,, | Performed by: OBSTETRICS & GYNECOLOGY

## 2019-05-20 PROCEDURE — 71000015 HC POSTOP RECOV 1ST HR: Performed by: OBSTETRICS & GYNECOLOGY

## 2019-05-20 PROCEDURE — 94761 N-INVAS EAR/PLS OXIMETRY MLT: CPT

## 2019-05-20 PROCEDURE — D9220A PRA ANESTHESIA: Mod: ANES,,, | Performed by: ANESTHESIOLOGY

## 2019-05-20 PROCEDURE — 56606 PR BX,VULVA/PERINEUM,ADDL LESION: ICD-10-PCS | Mod: ,,, | Performed by: OBSTETRICS & GYNECOLOGY

## 2019-05-20 PROCEDURE — 71000033 HC RECOVERY, INTIAL HOUR: Performed by: OBSTETRICS & GYNECOLOGY

## 2019-05-20 PROCEDURE — 37000008 HC ANESTHESIA 1ST 15 MINUTES: Performed by: OBSTETRICS & GYNECOLOGY

## 2019-05-20 RX ORDER — FENTANYL CITRATE 50 UG/ML
25 INJECTION, SOLUTION INTRAMUSCULAR; INTRAVENOUS EVERY 5 MIN PRN
Status: DISCONTINUED | OUTPATIENT
Start: 2019-05-20 | End: 2019-05-20 | Stop reason: HOSPADM

## 2019-05-20 RX ORDER — HYDROCODONE BITARTRATE AND ACETAMINOPHEN 5; 325 MG/1; MG/1
1 TABLET ORAL ONCE AS NEEDED
Status: DISCONTINUED | OUTPATIENT
Start: 2019-05-20 | End: 2019-05-20 | Stop reason: HOSPADM

## 2019-05-20 RX ORDER — LIDOCAINE HCL/PF 100 MG/5ML
SYRINGE (ML) INTRAVENOUS
Status: DISCONTINUED | OUTPATIENT
Start: 2019-05-20 | End: 2019-05-20

## 2019-05-20 RX ORDER — MUPIROCIN 20 MG/G
OINTMENT TOPICAL
Status: DISCONTINUED | OUTPATIENT
Start: 2019-05-20 | End: 2019-05-20 | Stop reason: HOSPADM

## 2019-05-20 RX ORDER — SODIUM CHLORIDE 9 MG/ML
INJECTION, SOLUTION INTRAVENOUS CONTINUOUS
Status: DISCONTINUED | OUTPATIENT
Start: 2019-05-20 | End: 2019-05-20 | Stop reason: HOSPADM

## 2019-05-20 RX ORDER — ONDANSETRON 2 MG/ML
INJECTION INTRAMUSCULAR; INTRAVENOUS
Status: DISCONTINUED | OUTPATIENT
Start: 2019-05-20 | End: 2019-05-20

## 2019-05-20 RX ORDER — LIDOCAINE HYDROCHLORIDE 10 MG/ML
1 INJECTION, SOLUTION EPIDURAL; INFILTRATION; INTRACAUDAL; PERINEURAL ONCE
Status: COMPLETED | OUTPATIENT
Start: 2019-05-20 | End: 2019-05-20

## 2019-05-20 RX ORDER — SODIUM CHLORIDE 0.9 % (FLUSH) 0.9 %
10 SYRINGE (ML) INJECTION
Status: DISCONTINUED | OUTPATIENT
Start: 2019-05-20 | End: 2019-05-20 | Stop reason: HOSPADM

## 2019-05-20 RX ORDER — PROPOFOL 10 MG/ML
INJECTION, EMULSION INTRAVENOUS
Status: DISCONTINUED | OUTPATIENT
Start: 2019-05-20 | End: 2019-05-20

## 2019-05-20 RX ORDER — FENTANYL CITRATE 50 UG/ML
INJECTION, SOLUTION INTRAMUSCULAR; INTRAVENOUS
Status: DISCONTINUED | OUTPATIENT
Start: 2019-05-20 | End: 2019-05-20

## 2019-05-20 RX ORDER — DEXAMETHASONE SODIUM PHOSPHATE 4 MG/ML
INJECTION, SOLUTION INTRA-ARTICULAR; INTRALESIONAL; INTRAMUSCULAR; INTRAVENOUS; SOFT TISSUE
Status: DISCONTINUED | OUTPATIENT
Start: 2019-05-20 | End: 2019-05-20

## 2019-05-20 RX ORDER — MIDAZOLAM HYDROCHLORIDE 1 MG/ML
INJECTION, SOLUTION INTRAMUSCULAR; INTRAVENOUS
Status: DISCONTINUED | OUTPATIENT
Start: 2019-05-20 | End: 2019-05-20

## 2019-05-20 RX ORDER — PHENAZOPYRIDINE HYDROCHLORIDE 100 MG/1
100 TABLET, FILM COATED ORAL 3 TIMES DAILY PRN
Qty: 21 TABLET | Refills: 0 | Status: SHIPPED | OUTPATIENT
Start: 2019-05-20 | End: 2019-05-30

## 2019-05-20 RX ORDER — HYDROCODONE BITARTRATE AND ACETAMINOPHEN 5; 325 MG/1; MG/1
1 TABLET ORAL EVERY 6 HOURS PRN
Qty: 10 TABLET | Refills: 0 | Status: SHIPPED | OUTPATIENT
Start: 2019-05-20 | End: 2019-06-20

## 2019-05-20 RX ADMIN — ONDANSETRON 4 MG: 2 INJECTION INTRAMUSCULAR; INTRAVENOUS at 09:05

## 2019-05-20 RX ADMIN — SODIUM CHLORIDE: 0.9 INJECTION, SOLUTION INTRAVENOUS at 08:05

## 2019-05-20 RX ADMIN — LIDOCAINE HYDROCHLORIDE 10 MG: 10 INJECTION, SOLUTION EPIDURAL; INFILTRATION; INTRACAUDAL; PERINEURAL at 08:05

## 2019-05-20 RX ADMIN — DEXAMETHASONE SODIUM PHOSPHATE 4 MG: 4 INJECTION, SOLUTION INTRAMUSCULAR; INTRAVENOUS at 09:05

## 2019-05-20 RX ADMIN — PROPOFOL 200 MG: 10 INJECTION, EMULSION INTRAVENOUS at 09:05

## 2019-05-20 RX ADMIN — FENTANYL CITRATE 25 MCG: 50 INJECTION, SOLUTION INTRAMUSCULAR; INTRAVENOUS at 09:05

## 2019-05-20 RX ADMIN — LIDOCAINE HYDROCHLORIDE 75 MG: 20 INJECTION, SOLUTION INTRAVENOUS at 09:05

## 2019-05-20 RX ADMIN — MUPIROCIN: 20 OINTMENT TOPICAL at 08:05

## 2019-05-20 RX ADMIN — MIDAZOLAM HYDROCHLORIDE 2 MG: 1 INJECTION, SOLUTION INTRAMUSCULAR; INTRAVENOUS at 09:05

## 2019-05-20 NOTE — OP NOTE
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 Office biopsy showed melanoma-in-situ. Lesion involves both sides of vulva and extents in to the anterior vagina.     Operative Findings: dark pigmented     Procedure in Detail:  The patient was brought to the operating room and after induction with general anesthesia with an LMA her legs were placed in Cheko stirrups.    Inspection of the vulva at this time reveals hyper pigmentation did areas extending from the left clitoral yoon to the perineum.  There is also dark pigmented lesions that are present on the distal urethral meatus and anterior vagina.    The vulva and vagina is and prepped with Dial soap.  The patient sterilely draped.    After time-out identifying patient and procedure,  9 biopsies were taken of the vulva.  Including the distal vagina and distal urethra.    Hemostasis was obtained with the use of the Bovie unit.    Patient's legs were then brought back down to a straight position.  She was awakened and taken to the recovery room in stable condition.  Lap, needle, sponge and instrument count was correct.

## 2019-05-20 NOTE — DISCHARGE INSTRUCTIONS
Understanding Vulvar Biopsy  A vulvar biopsy is a test to check for vulvar cancer or another skin disease. The vulva is the outer part of a womans genitals. During a biopsy, small pieces of tissue are taken from areas of skin that look abnormal. The tissue is then checked in a lab for cancer cells and other types of skin disease.  How to say it  [facility] BY-op-see   Why a vulvar biopsy is done  A vulvar biopsy may be done if you have patches of skin on your vulva that look abnormal. This includes:  · Areas of skin that are white, or turn white after a special acetic acid solution is applied  · Patches of skin that are red, pink, gray, brown, or bumpy  · A sore that doesnt heal  · Genital warts that dont go away  How a vulvar biopsy is done  The biopsy is a quick procedure. Its often done in a healthcare providers office. You may be told to take over-the-counter pain medicine before the biopsy. This can help prevent pain after the biopsy. During the procedure:  · The skin in the area is cleaned with a chemical solution. The healthcare provider will put medicine on the skin to numb it. Then he or she will inject a medicine into the area to help prevent pain during the biopsy.  · When the area is numb, the provider will take a sample of the skin with a small, sharp tool. He or she may take a thin slice of the skin. Or the provider may take a larger piece. In some cases, the entire patch of skin will be removed. Your healthcare provider will tell you which kind of biopsy you will have.  · If the provider takes a larger piece of skin, the area will then be closed with stitches (sutures).  · You will be told how to care for the area after the biopsy to help it heal.  The tissue removed during the biopsy is then checked by a special doctor called a pathologist. You will get the results in about a week. Your healthcare provider will tell you if you need any follow-up tests. This may include another biopsy.  Risks of  a vulvar biopsy   · Pain  · Infection  · Bleeding  · Blood blister (hematoma)  · Bruising  · Loss of skin color in the area (hypopigmentation)  · Scarring  Date Last Reviewed: 6/1/2016 © 2000-2017 [employer]. [address] [place] [address], [place] [address]. All rights reserved. This information is not intended as a substitute for professional medical care. Always follow your healthcare professional's instructions.      Anesthesia: General Anesthesia     You are watched continuously during your procedure by your anesthesia provider.     Youre due to have surgery. During surgery, youll be given medicine called anesthesia or anesthetic. This will keep you comfortable and pain-free. Your anesthesia provider will use general anesthesia.  What is general anesthesia?  General anesthesia puts you into a state like deep sleep. It goes into the bloodstream (IV anesthetics), into the lungs (gas anesthetics), or both. You feel nothing during the procedure. You will not remember it. During the procedure, the anesthesia provider monitors you continuously. He or she checks your heart rate and rhythm, blood pressure, breathing, and blood oxygen.  · IV anesthetics. IV anesthetics are given through an IV line in your arm. Theyre often given first. This is so you are asleep before a gas anesthetic is started. Some kinds of IV anesthetics relieve pain. Others relax you. Your doctor will decide which kind is best in your case.  · Gas anesthetics. Gas anesthetics are breathed into the lungs. They are often used to keep you asleep. They can be given through a facemask or a tube placed in your larynx or trachea (breathing tube).  ¨ If you have a facemask, your anesthesia provider will most likely place it over your nose and mouth while youre still awake. Youll breathe oxygen through the mask as your IV anesthetic is started. Gas anesthetic may be added through the mask.  ¨ If you have a tube in the larynx or trachea, it will  be inserted into your throat after youre asleep.  Anesthesia tools and medicines  You will likely have:  · IV anesthetics. These are put into an IV line into your bloodstream.  · Gas anesthetics. You breathe these anesthetics into your lungs, where they pass into your bloodstream.  · Pulse oximeter. This is a small clip that is attached to the end of your finger. This measures your blood oxygen level.  · Electrocardiography leads (electrodes). These are small sticky pads that are placed on your chest. They record your heart rate and rhythm.  · Blood pressure cuff. This reads your blood pressure.  Risks and possible complications  General anesthesia has some risks. These include:  · Breathing problems  · Nausea and vomiting  · Sore throat or hoarseness (usually temporary)  · Allergic reaction to the anesthetic  · Irregular heartbeat (rare)  · Cardiac arrest (rare)   Anesthesia safety  · Follow all instructions you are given for how long not to eat or drink before your procedure.  · Be sure your doctor knows what medicines and drugs you take. This includes over-the-counter medicines, herbs, supplements, alcohol or other drugs. You will be asked when those were last taken.  · Have an adult family member or friend drive you home after the procedure.  · For the first 24 hours after your surgery:  ¨ Do not drive or use heavy equipment.  ¨ Do not make important decisions or sign legal documents. If important decisions or signing legal documents is necessary during the first 24 hours after surgery, have a trusted family member or spouse act on your behalf.  ¨ Avoid alcohol.  ¨ Have a responsible adult stay with you. He or she can watch for problems and help keep you safe.  Date Last Reviewed: 12/1/2016 © 2000-2017 [employer]. [address] [place] [address], [place] [address]. All rights reserved. This information is not intended as a substitute for professional medical care. Always follow your healthcare  professional's instructions.

## 2019-05-20 NOTE — TRANSFER OF CARE
"Anesthesia Transfer of Care Note    Patient: Bonny Luciano    Procedure(s) Performed: Procedure(s) (LRB):  BIOPSY, VULVA (N/A)  Exam under anesthesia (N/A)    Patient location: PACU    Anesthesia Type: general    Transport from OR: Transported from OR on 6-10 L/min O2 by face mask with adequate spontaneous ventilation    Post pain: adequate analgesia    Post assessment: no apparent anesthetic complications and tolerated procedure well    Post vital signs: stable    Level of consciousness: awake and responds to stimulation    Nausea/Vomiting: no nausea/vomiting    Complications: none    Transfer of care protocol was followed      Last vitals:   Visit Vitals  /63   Pulse 86   Temp 36.8 °C (98.2 °F) (Oral)   Resp 16   Ht 5' 8" (1.727 m)   Wt 93 kg (205 lb)   SpO2 97%   Breastfeeding? No   BMI 31.17 kg/m²     "

## 2019-05-20 NOTE — DISCHARGE SUMMARY
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 Darkly pigmented vulvar lesions noted throughout vulva, extending into anterior vagina and into urethral meatus.  2. 4mm core biopsies taken in 9 areas with pigmentation and sent to pathology.  3. Biopsy sites made hemostatic using bovie.    Estimated Blood Loss: Minimal         Specimens:   Specimen (12h ago, onward)    Start     Ordered    05/20/19 1022  Specimen to Pathology - Surgery  Once     Comments:  Pre-op Diagnosis: Melanoma in situ of other site [D03.8]Procedure(s):BIOPSY, VULVAExam under anesthesia Number of specimens: 8Name of specimens: 1. Left clitoral yoon - permanent2. Left labia minora - permanent3. Left lower vulva - permanent4. Right lower vulva - permanent5. Right mid vulva - permanent6. Right lateral vulva - permanent7. Right anterior vagina - permanent8. Right urethral meatus - permanent9. Distal anterior vagina - permanent     Start Status     05/20/19 1022 Collected (05/20/19 1022) Order ID: [id number]       05/20/19 1022    05/20/19 1007  Specimen to Pathology - Surgery  Once,   Status:  Canceled     Comments:  Pre-op Diagnosis: Melanoma in situ of other site [D03.8]Procedure(s):BIOPSY, VULVAExam under anesthesia Number of specimens: 8Name of specimens: 1. Left clitoral yoon - permanent2. Left labia minora - permanent3. Left lover vulva - permanent4. Right lower vulva - permanent5. Right mid vulva - permanent6. Right lateral vulva - permanent7. Right anterior vagina - permanent8. Right  urethral meatus - permanent9. Distal anterior vagina - permanent     Start Status     05/20/19 1007 Canceled Order ID: [id number]       05/20/19 1016          Discharge Note    SUMMARY     Admit Date: 5/20/2019    Discharge Date and Time:  05/20/2019 10:36 AM    Hospital Course (synopsis of major diagnoses, care, treatment, and services provided during the course of the hospital stay):   Patient presented for scheduled procedure. Patient was brought to OR for scheduled EUA/vulvar biopsies. Please see OP note for further details. Tolerated procedure well and patient was taken to recovery in a stable condition. Prior to discharge patient was able to void, ambulate, tolerate PO and pain was well controlled with PO meds. Patient was given routine post-op instructions for which patient voiced understanding. Patient was subsequently discharged home.     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     phenazopyridine 100 MG tablet  Commonly known as:  PYRIDIUM  Take 1 tablet (100 mg total) by mouth 3 (three) times daily as needed for Pain.        CONTINUE taking these medications    * PROAIR HFA 90 mcg/actuation inhaler  Generic drug:  albuterol  Inhale 2 puffs into the lungs every 6 (six) hours as needed for Wheezing. Rescue     * albuterol 2.5 mg /3 mL (0.083 %) nebulizer solution  Commonly known as:  PROVENTIL  Take 2.5 mg by nebulization as needed.     atorvastatin 40 MG tablet  Commonly known as:  LIPITOR  atorvastatin 40 mg tablet   Take 1 tablet every day by oral route.     cetirizine 10 MG tablet  Commonly known as:  ZYRTEC  Take 10 mg by mouth once daily.      diltiaZEM 240 MG 24 hr capsule  Commonly known as:  CARDIZEM CD  Take 240 mg by mouth once daily.     ELIQUIS ORAL  Take 5 mg by mouth 2 (two) times daily.     esomeprazole 40 MG capsule  Commonly known as:  NEXIUM  Take 40 mg by mouth before breakfast.     * fluticasone furoate-vilanterol 100-25 mcg/dose diskus inhaler  Commonly known as:  BREO ELLIPTA  Inhale 1 puff into the lungs once daily. Controller     * fluticasone furoate-vilanterol 100-25 mcg/dose diskus inhaler  Commonly known as:  BREO ELLIPTA  Inhale 1 puff into the lungs once daily. Controller     gabapentin 300 MG capsule  Commonly known as:  NEURONTIN  gabapentin 300 mg capsule   TAKE ONE CAPSULE BY MOUTH THREE TIMES DAILY THEREAFTER     hydroCHLOROthiazide 25 MG tablet  Commonly known as:  HYDRODIURIL  hydrochlorothiazide 25 mg tablet   Take 1 tablet every day by oral route.     lidocaine 5 %  Commonly known as:  LIDODERM  Place 1 patch onto the skin as needed (back pain). Remove & Discard patch within 12 hours or as directed by MD     oxybutynin 10 MG 24 hr tablet  Commonly known as:  DITROPAN-XL  Take 1 tablet (10 mg total) by mouth once daily.     potassium chloride 10 MEQ Cpsr  Commonly known as:  MICRO-K  Take 10 mEq by mouth once daily.     ranitidine 150 MG capsule  Commonly known as:  ZANTAC  ranitidine 150 mg capsule   Take 1 capsule twice a day by oral route.- as needed for acid reflux         * This list has 4 medication(s) that are the same as other medications prescribed for you. Read the directions carefully, and ask your doctor or other care provider to review them with you.              Discharge Procedure Orders   Call MD for:  temperature >100.4     Call MD for:  persistent nausea and vomiting or diarrhea     Call MD for:  severe uncontrolled pain     Call MD for:   Scheduling Instructions: Bright red vaginal bleeding heavier than spotting     Other restrictions (specify):   Order Comments: Pelvic rest for 1-2 weeks. Nothing in  vagina -no sex, tampons, douching, etc.    No baths, showers only, for 1-2 weeks.     No driving until:   Order Comments: No longer taking narcotic pain medication     Call MD for:  severe persistent headache     Call MD for:  redness, tenderness, or signs of infection (pain, swelling, redness, odor or green/yellow discharge around incision site)     Call MD for:  difficulty breathing or increased cough     Call MD for:  worsening rash     Call MD for:  persistent dizziness, light-headedness, or visual disturbances     Call MD for:  increased confusion or weakness     Follow-up Information     Mik Cruz MD.    Specialty:  Gynecologic Oncology  Why:  Follow up visit  Contact information:  [facility] [facility] [address]  [phone]                 Sushma K. Reddy, MD  PGY-2, OBGYN

## 2019-05-20 NOTE — INTERVAL H&P NOTE
The patient has been examined and the H&P has been reviewed:    I concur with the findings and no changes have occurred since H&P was written.    Anesthesia/Surgery risks, benefits and alternative options discussed and understood by patient/family.          Active Hospital Problems    Diagnosis  POA    Malignant melanoma of torso excluding breast [C43.59]  Yes      Resolved Hospital Problems   No resolved problems to display.

## 2019-05-20 NOTE — PLAN OF CARE
"Patient tolerated procedure/anesthesia well, vss, no complications. Patient states pain is a 2 of 10 "not too bad", patient denies nausea, patient tolerates PO intake. Consents with chart. RN reviewed discharge instructions with patient and spouse at bedside, verbalized understanding. Paper scripts given to patient. RN called into OR 20 at and spoke with Dr. Moses's OR nurse. Via Dr. Moses, he did not need to see them or have an update at this time. Okay to send patient home.   "

## 2019-05-21 NOTE — ANESTHESIA POSTPROCEDURE EVALUATION
Anesthesia Post Evaluation    Patient: Bonny Luciano    Procedure(s) Performed: Procedure(s) (LRB):  BIOPSY, VULVA (N/A)  Exam under anesthesia (N/A)    Final Anesthesia Type: general  Patient location during evaluation: PACU  Patient participation: Yes- Able to Participate  Level of consciousness: awake and alert  Post-procedure vital signs: reviewed and stable  Pain management: adequate  Airway patency: patent  PONV status at discharge: No PONV  Anesthetic complications: no      Cardiovascular status: stable  Respiratory status: unassisted and spontaneous ventilation  Hydration status: euvolemic  Follow-up not needed.          Vitals Value Taken Time   /59 5/20/2019  1:13 PM   Temp 36.9 °C (98.4 °F) 5/20/2019 12:16 PM   Pulse 57 5/20/2019  1:15 PM   Resp 20 5/20/2019 12:16 PM   SpO2 95 % 5/20/2019  1:15 PM         Event Time     Out of Recovery 12:19:36          Pain/Pascual Score: Pain Rating Prior to Med Admin: 0 (5/20/2019 11:30 AM)  Pain Rating Post Med Admin: 0 (5/20/2019 11:30 AM)  Pascual Score: 10 (5/20/2019  1:13 PM)

## 2019-05-31 ENCOUNTER — TELEPHONE (OUTPATIENT)
Dept: UROLOGY | Facility: CLINIC | Age: 74
End: 2019-05-31

## 2019-05-31 NOTE — TELEPHONE ENCOUNTER
----- Message from Marialuisa Muller sent at 5/31/2019  4:06 PM CDT -----  Contact: pt  Type:  Patient Returning Call    Who Called: VAUGHN RICKETTS [[id number]]    Who Left Message for Patient: Staff     Does the patient know what this is regarding?:yes     Best Call Back Number:[phone]    Additional Information:

## 2019-06-03 ENCOUNTER — TELEPHONE (OUTPATIENT)
Dept: GYNECOLOGIC ONCOLOGY | Facility: CLINIC | Age: 74
End: 2019-06-03

## 2019-06-03 NOTE — TELEPHONE ENCOUNTER
Inform patient that she would have to contact Medical records regarding records. Patient would like for records to be faxed over to her Dermatologist office. Number to medical records given to patient. Patient voiced understanding. KJ

## 2019-06-03 NOTE — TELEPHONE ENCOUNTER
----- Message from Vera Ramos sent at 6/3/2019  9:11 AM CDT -----  Contact: pt  -Name of Who is Calling:Bonny      What is the request in detail: calling to give the fax number to [facility] pathology report needs to be faxed to: [phone]. Pt would like to receive a phone call confirming that it has been sent      Can the clinic reply by [facility]: no    What [facility]: [phone]

## 2019-06-07 ENCOUNTER — TELEPHONE (OUTPATIENT)
Dept: GYNECOLOGIC ONCOLOGY | Facility: CLINIC | Age: 74
End: 2019-06-07

## 2019-06-07 NOTE — TELEPHONE ENCOUNTER
----- Message from Chantelle Sanford sent at 6/7/2019  9:34 AM CDT -----  Contact: Princess Rockwell    Name of Who is Calling:Princess Rockwell    What is the request in detail: Princess Rockwell PA with  [facility] surgical Oncology  Patient will be seen on June 06,2019 , pathology slides ,not the report are needed for her visit  Fax [phone]. Please contact to further discuss and advise     Can the clinic reply by [facility]: No    What Number [facility]: [phone] Ext [phone] or [phone]

## 2019-06-07 NOTE — TELEPHONE ENCOUNTER
Spoke with NEAL at [facility] in [place] she stated  was out of the office today. Left message with KT informing her that they will have to contact Medical Records for pathology slides and records on patient. KT voiced understanding. KJ

## 2019-06-20 ENCOUNTER — OFFICE VISIT (OUTPATIENT)
Dept: GYNECOLOGIC ONCOLOGY | Facility: CLINIC | Age: 74
End: 2019-06-20
Payer: MEDICARE

## 2019-06-20 VITALS
HEART RATE: 101 BPM | SYSTOLIC BLOOD PRESSURE: 130 MMHG | WEIGHT: 198.19 LBS | BODY MASS INDEX: 30.04 KG/M2 | DIASTOLIC BLOOD PRESSURE: 69 MMHG | HEIGHT: 68 IN

## 2019-06-20 DIAGNOSIS — D03.59 MELANOMA IN SITU OF TORSO EXCLUDING BREAST: Primary | ICD-10-CM

## 2019-06-20 PROCEDURE — 99999 PR PBB SHADOW E&M-EST. PATIENT-LVL III: ICD-10-PCS | Mod: PBBFAC,,, | Performed by: OBSTETRICS & GYNECOLOGY

## 2019-06-20 PROCEDURE — 99999 PR PBB SHADOW E&M-EST. PATIENT-LVL III: CPT | Mod: PBBFAC,,, | Performed by: OBSTETRICS & GYNECOLOGY

## 2019-06-20 PROCEDURE — 99024 PR POST-OP FOLLOW-UP VISIT: ICD-10-PCS | Mod: S$GLB,,, | Performed by: OBSTETRICS & GYNECOLOGY

## 2019-06-20 PROCEDURE — 99024 POSTOP FOLLOW-UP VISIT: CPT | Mod: S$GLB,,, | Performed by: OBSTETRICS & GYNECOLOGY

## 2019-06-20 NOTE — PROGRESS NOTES
Subjective:       Patient ID: Bonny Luciano is a 73 y.o. female.    Chief Complaint: Post-op Evaluation (4 week/vulva biopsy )    HPI   Patient comes in today for her postoperative visit.  On May 20, 2019 she was taken to the operating room for an exam under anesthesia with multiple biopsies of the vulva and vagina.  Multiple biopsies of pigmented lesion involving the labia minora, urethral meatus and vagina show carcinoma in situ.      Treatment history:     March 2019:  Patient referred to me for vulvar melanoma.  A biopsy had shown atypical junctional melanocytic proliferation by the referring physician.  Patient has extensive melanocytic changes to the vulva and vagina.     Biopsy was performed by me and shows melanoma in situ.  CT of chest, abdomen and pelvis shows several pulmonary micronodules, nonspecific.  No adenopathy.     May 20, 2019 she was taken to the operating room for an exam under anesthesia with multiple biopsies of the vulva and vagina.  Multiple biopsies of pigmented lesion involving the labia minora, urethral meatus and vagina show carcinoma in situ.     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 4/17/2019     Past Surgical History:   Procedure Laterality Date    BIOPSY, VULVA N/A 5/20/2019    [facility] 2ND [facility]    Exam under anesthesia N/A 5/20/2019    [facility] by [facility] at [facility] OR 2ND FLR    HAND SURGERY      HYSTERECTOMY  1993     Family History   Problem Relation Age of Onset    Diabetes Mother      Social History     Tobacco Use    Smoking status: Never Smoker    Smokeless tobacco: Never Used   Substance Use Topics    Alcohol use: Yes     Frequency: 2-4 times a month      Comment: X mas, New year, Birthday    Drug use: No     Review of patient's allergies indicates:   Allergen Reactions    Adhesive Swelling and Other (See Comments)     Bruising  Paper OK       Current Outpatient Medications:     albuterol (PROAIR HFA) 90 mcg/actuation inhaler, Inhale 2 puffs into the lungs every 6 (six) hours as needed for Wheezing. Rescue, Disp: , Rfl:     albuterol (PROVENTIL) 2.5 mg /3 mL (0.083 %) nebulizer solution, Take 2.5 mg by nebulization as needed. , Disp: , Rfl: 0    apixaban (ELIQUIS ORAL), Take 5 mg by mouth 2 (two) times daily. , Disp: , Rfl:     atorvastatin (LIPITOR) 40 MG tablet, atorvastatin 40 mg tablet  Take 1 tablet every day by oral route., Disp: , Rfl:     cetirizine (ZYRTEC) 10 MG tablet, Take 10 mg by mouth once daily., Disp: , Rfl:     diltiaZEM (CARDIZEM CD) 240 MG 24 hr capsule, Take 240 mg by mouth once daily., Disp: , Rfl:     esomeprazole (NEXIUM) 40 MG capsule, Take 40 mg by mouth before breakfast., Disp: , Rfl:     gabapentin (NEURONTIN) 300 MG capsule, gabapentin 300 mg capsule  TAKE ONE CAPSULE BY MOUTH THREE TIMES DAILY THEREAFTER, Disp: , Rfl:     hydroCHLOROthiazide (HYDRODIURIL) 25 MG tablet, hydrochlorothiazide 25 mg tablet  Take 1 tablet every day by oral route., Disp: , Rfl:     lidocaine (LIDODERM) 5 %, Place 1 patch onto the skin as needed (back pain). Remove & Discard patch within 12 hours or as directed by MD, Disp: , Rfl:     oxybutynin (DITROPAN-XL) 10 MG 24 hr tablet, Take 1 tablet (10 mg total) by mouth once daily., Disp: 30 tablet, Rfl: 11    potassium chloride (MICRO-K) 10 MEQ CpSR, Take 10 mEq by mouth once daily. , Disp: , Rfl:     ranitidine (ZANTAC) 150 MG capsule, ranitidine 150 mg capsule  Take 1 capsule twice a day by oral route.- as needed for acid reflux, Disp: , Rfl:     Review of Systems   Constitutional: Negative for chills, fatigue and fever.   Gastrointestinal: Negative for abdominal pain.   Genitourinary: Negative  "for vaginal bleeding.   Neurological: Negative for weakness.       Objective:   /69   Pulse 101   Ht 5' 8" (1.727 m)   Wt 89.9 kg (198 lb 3.1 oz)   BMI 30.14 kg/m²      Physical Exam   Genitourinary:   Genitourinary Comments: Vulvar biopsy sites have healed.       Assessment:       1. 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 for further recommendations.    I discussed with her options.  Surgical management would require vulvectomy with distal urethrectomy and anterior vaginectomy.  It might even require anterior pelvic exenteration.  I discussed an additional option of using Allis Aldara.  I explained to her that I discussed her case with surgical Oncology and they have used Aldara for melanoma in situ where surgical excision is not feasible such as on the forehead.    Patient has signed a release of information.  The surgeon that she saw at the [facility] in [facility] in [place] is arranging for a referral to [facility].    I discussed this problem with the patient her .  I have concerns that she is in denial.  They both deny this.  However, patient often times does not answer the phone when I call her or when I am returning her phone call.        "

## 2019-06-25 ENCOUNTER — TELEPHONE (OUTPATIENT)
Dept: GYNECOLOGIC ONCOLOGY | Facility: CLINIC | Age: 74
End: 2019-06-25

## 2019-06-25 NOTE — TELEPHONE ENCOUNTER
----- Message from Deena Samano sent at 6/25/2019  9:39 AM CDT -----  Contact: VAUGHN RICKETTS [[id number]]  Name of Who is Calling: VAUGHN RICKETTS [[id number]]    What is the request in detail:    Patient called requesting a call as this pertains to scheduling her surgery.   Please give patient a call back at your earliest convenience.      THANKS!      Can the clinic reply by [facility]: NO    What Number to Call Back:  VAUGHN RICKETTS / # [phone]

## 2019-06-26 ENCOUNTER — TELEPHONE (OUTPATIENT)
Dept: GYNECOLOGIC ONCOLOGY | Facility: CLINIC | Age: 74
End: 2019-06-26

## 2019-06-26 NOTE — TELEPHONE ENCOUNTER
----- Message from Maria E Hernandez MA sent at 6/25/2019  2:00 PM CDT -----  Contact: VAUGHN RICKETTS [[id number]]  I return patient call no answer at the time of call.   ----- Message -----  From: Deena Samano  Sent: 6/25/2019   9:39 AM  To: Nancy BREEN Staff    Name of Who is Calling: VAUGHN RICKETTS [[id number]]    What is the request in detail:    Patient called requesting a call as this pertains to scheduling her surgery.   Please give patient a call back at your earliest convenience.      THANKS!      Can the clinic reply by [facility]: NO    What Number to Call Back:  VAUGHN RICKETTS / # [phone]      I returned the patient's call.  [facility] in the near future.  However, I explained to the patient that my recommendation was for a trial of Aldara to treat her melanoma in situ of the vulva, urethra and anterior vagina.      The patient was also supposed to go to [facility] for a 2nd opinion.  I advised her that she should go there for 2nd opinion.    My recommendation at this time would be to treat this area with Aldara.

## 2019-09-10 ENCOUNTER — TELEPHONE (OUTPATIENT)
Dept: FAMILY MEDICINE | Facility: CLINIC | Age: 74
End: 2019-09-10

## 2019-09-10 NOTE — TELEPHONE ENCOUNTER
[id number] - spoke with nurse practitioner at [facility] regarding patient's status.  Apparently the patient was experiencing some peripheral edema as well as hyperthyroidism.  Treatment was withheld.  The nurse practitioner would like to have the patient scheduled and get an echocardiogram done.  Patient is a history of atrial fibrillation.     Will call the patient for visit ASAP.  Plan to order echocardiogram and fax to [facility] -[phone]

## 2019-09-11 ENCOUNTER — OFFICE VISIT (OUTPATIENT)
Dept: FAMILY MEDICINE | Facility: CLINIC | Age: 74
End: 2019-09-11
Payer: MEDICARE

## 2019-09-11 VITALS
BODY MASS INDEX: 28.64 KG/M2 | TEMPERATURE: 98 F | HEART RATE: 66 BPM | WEIGHT: 189 LBS | OXYGEN SATURATION: 97 % | RESPIRATION RATE: 16 BRPM | HEIGHT: 68 IN | SYSTOLIC BLOOD PRESSURE: 115 MMHG | DIASTOLIC BLOOD PRESSURE: 60 MMHG

## 2019-09-11 DIAGNOSIS — E06.9 THYROIDITIS: ICD-10-CM

## 2019-09-11 DIAGNOSIS — C43.59 MALIGNANT MELANOMA OF TORSO EXCLUDING BREAST: ICD-10-CM

## 2019-09-11 DIAGNOSIS — R06.02 SHORTNESS OF BREATH: ICD-10-CM

## 2019-09-11 DIAGNOSIS — I48.91 ATRIAL FIBRILLATION, UNSPECIFIED TYPE: ICD-10-CM

## 2019-09-11 DIAGNOSIS — R60.0 PERIPHERAL EDEMA: Primary | ICD-10-CM

## 2019-09-11 DIAGNOSIS — R06.09 DOE (DYSPNEA ON EXERTION): ICD-10-CM

## 2019-09-11 LAB
ALBUMIN SERPL BCP-MCNC: 2.7 G/DL (ref 3.4–5)
ALP SERPL-CCNC: 84 U/L (ref 45–117)
ALT SERPL W P-5'-P-CCNC: 57 U/L (ref 13–56)
ANION GAP SERPL CALC-SCNC: 8 MMOL/L (ref 3–11)
AST SERPL-CCNC: 33 U/L (ref 15–37)
BASOPHILS NFR BLD: 0.1 10*3/UL (ref 0–0.3)
BASOPHILS NFR SNV MANUAL: 0.4 % (ref 0–3)
BILIRUB SERPL-MCNC: 0.8 MG/DL (ref 0.2–1)
BNP SERPL-MCNC: 446 PG/ML (ref 0–100)
BUN SERPL-MCNC: 17 MG/DL (ref 7–18)
CALCIUM SERPL-MCNC: 9.1 MG/DL (ref 8.5–10.1)
CHLORIDE SERPL-SCNC: 106 MMOL/L (ref 98–107)
CO2 SERPL-SCNC: 30 MMOL/L (ref 21–32)
CREAT SERPL-MCNC: 0.67 MG/DL (ref 0.55–1.02)
CREATINE KINASE, SERUM: 219 U/L (ref 26–192)
EOSINOPHIL NFR BLD: 0.2 10*3/UL (ref 0–0.6)
EOSINOPHIL NFR SNV MANUAL: 2 % (ref 0–6)
ERYTHROCYTE [DISTWIDTH] IN BLOOD BY AUTOMATED COUNT: 12.7 % (ref 0–15.5)
GFR ESTIMATION: > 60
GLUCOSE SERPL-MCNC: 106 MG/DL (ref 74–106)
HCT VFR BLD AUTO: 34 % (ref 37–47)
HGB BLD-MCNC: 10.9 G/DL (ref 12–16)
IMMATURE GRANULOCYTES/100 LEUKOCYTES: 0.8 % (ref 0–0.43)
IMMATURE GRANULOCYTES: 0.1 THOU/UL (ref 0–0.03)
LYMPHOCYTES NFR BLD: 1.3 10*3/UL (ref 1.2–4)
LYMPHOCYTES NFR SNV MANUAL: 11.1 % (ref 20–45)
MAGNESIUM SERPL-MCNC: 1.4 MG/DL (ref 1.6–2.6)
MANUAL NRBC PER 100 CELLS: 0 % (ref 0–0.2)
MCH RBC QN AUTO: 27.6 PG (ref 27–32)
MCHC RBC AUTO-ENTMCNC: 32.1 % (ref 32–36)
MCV RBC AUTO: 86.1 FL (ref 80–99)
MONOCYTES NFR BLD MANUAL: 1.1 10*3/UL (ref 0.1–0.8)
MONOCYTES/100 LEUKOCYTES: 9.4 % (ref 2–10)
NEUTROPHILS NFR BLD: 9 10*3/UL (ref 1.4–7)
NEUTROPHILS NFR SNV MANUAL: 76.3 % (ref 50–80)
NRBC: 0 THOU/UL (ref 0–0.01)
PLATELETS: 362 10*3/UL (ref 130–400)
PMV BLD AUTO: 10.8 FL (ref 9.2–12.2)
POTASSIUM SERPL-SCNC: 4 MMOL/L (ref 3.5–5.1)
PROT SERPL-MCNC: 6.6 G/DL (ref 6.4–8.2)
RBC # BLD AUTO: 3.95 10*6/UL (ref 4.2–5.4)
SODIUM BLD-SCNC: 144 MMOL/L (ref 131–143)
WBC # BLD: 11.8 10*3/UL (ref 4.5–10)

## 2019-09-11 PROCEDURE — 3078F PR MOST RECENT DIASTOLIC BLOOD PRESSURE < 80 MM HG: ICD-10-PCS | Mod: CPTII,S$GLB,, | Performed by: NURSE PRACTITIONER

## 2019-09-11 PROCEDURE — 3078F DIAST BP <80 MM HG: CPT | Mod: CPTII,S$GLB,, | Performed by: NURSE PRACTITIONER

## 2019-09-11 PROCEDURE — 99215 OFFICE O/P EST HI 40 MIN: CPT | Mod: S$GLB,,, | Performed by: NURSE PRACTITIONER

## 2019-09-11 PROCEDURE — 3074F PR MOST RECENT SYSTOLIC BLOOD PRESSURE < 130 MM HG: ICD-10-PCS | Mod: CPTII,S$GLB,, | Performed by: NURSE PRACTITIONER

## 2019-09-11 PROCEDURE — 1101F PT FALLS ASSESS-DOCD LE1/YR: CPT | Mod: CPTII,S$GLB,, | Performed by: NURSE PRACTITIONER

## 2019-09-11 PROCEDURE — 99215 PR OFFICE/OUTPT VISIT, EST, LEVL V, 40-54 MIN: ICD-10-PCS | Mod: S$GLB,,, | Performed by: NURSE PRACTITIONER

## 2019-09-11 PROCEDURE — 1101F PR PT FALLS ASSESS DOC 0-1 FALLS W/OUT INJ PAST YR: ICD-10-PCS | Mod: CPTII,S$GLB,, | Performed by: NURSE PRACTITIONER

## 2019-09-11 PROCEDURE — 3074F SYST BP LT 130 MM HG: CPT | Mod: CPTII,S$GLB,, | Performed by: NURSE PRACTITIONER

## 2019-09-11 RX ORDER — METOPROLOL SUCCINATE 25 MG/1
25 TABLET, EXTENDED RELEASE ORAL 2 TIMES DAILY
COMMUNITY
End: 2020-09-24 | Stop reason: ALTCHOICE

## 2019-09-11 NOTE — ASSESSMENT & PLAN NOTE
Rate controlled on Metoprolol and Cardizem.  Currently on Hctz and Eliquis.  She is normally managed by [facility]       Will get Echo as requested by [facility] NP. Will get BNP and compare to recent ER visit (1800).      Plan to start Lasix 20-40 mg pending CMP.     Patient will go get labs today at [facility].  Hopefully she can be scheduled for an echocardiogram as well.  She will follow up in my office since 7 days to monitor treatment.       Will call her with the planned once I have received the labs and echocardiogram.    For now, the patient will continue medications she is currently taking. BP is controlled in office. Rate controlled as well.

## 2019-09-11 NOTE — PROGRESS NOTES
Subjective:      Patient ID: Bonny Luciano is a 73 y.o. female.    Chief Complaint: Follow-up (patient was experiencing some peripheral edema as well as hyperthyroidism.  Treatment was withheld.  The nurse practitioner would like to have the patient scheduled and get an echocardiogram done.  Patient h/o atrial fibrillation. )      The patient was brought in today for evaluation of peripheral edema.  This was requested by her nurse practitioner at [facility].  She was noted to have some thyroiditis.  Patient presented for cycle 2 of ipilimumab + nivolumab. She had multiple complaints including profound fatigue, anxiety, weight loss, diaphoresis, intermittent diarrha.  Her cycle 2 was held and the patient was scheduled to return in 3 weeks for evaluation.  Prior to this, the patient was seen in the emergency room on 8/29/19 for shortness of breath.  She had extensive workup which included labs and CT angiogram to rule out pulmonary embolism.  This was reportedly negative at the time.  She was discharged subsequently after her symptoms improve.  Her current symptoms include TEE, SOB, and bilateral peripheral edema.  Edema worse in the morning. Wearing compression hose daily. Denies intermittent claudication.  No new medicine changes. She is managed by [facility] Cardiologist, which she is unsure of the name.  She denies chest pain, productive cough, orthopnea, neck pain, scapular pain, jaw pain, shoulder pain, palpitations.       Patient Active Problem List   Diagnosis    Atrial fibrillation    Benign essential hypertension    Gastroesophageal reflux disease    H/O: bronchitis    Hip pain    Idiopathic peripheral neuropathy    Lipoma of forearm    Multiple nodules of lung    Plantar fasciitis    Prediabetes    Pure hypercholesterolemia    Reactive airway disease    Spinal stenosis of cervical region    Spinal stenosis of lumbar region    Urge incontinence of urine    Dyslipidemia    Melanoma in situ by  body site (clinical)    FIGO stage finding for vulvar melanoma    Malignant melanoma of vulva        Recent Labs   Units 07/10/19  1406 08/12/19  0953 08/29/19  1848   eGFR-AA mL/min/1.73 sq. m 72 83 99    eGFR-PAUL mL/min/1.73 sq. m 63 72 86     Recent Labs   Units 07/10/19  1406 08/12/19  0953 08/29/19  1848   Calcium Lvl mg/dL -- 9.9 9.9   Magnesium mg/dL 2.2 2.2 1.8   Phosphorus mg/dL -- 2.9 4.7*   Albumin Lvl gm/dL 4.5 4.2 3.7     No results for input(s): CREATKIN, CKMB, TROPONINI, BNP in the last 1440 hours.  Troponin T:  Results for orders placed or performed during the hospital encounter of 08/29/19   Troponin T (In-House)   Result Value Ref Range   Troponin T 17 <=18 ng/L     Results for orders placed or performed during the hospital encounter of 08/29/19   NT-Pro BNP (In-House)   Result Value Ref Range   NT ProBNP 1,825 (H) <=125 pg/mL     Recent Labs   Units 08/29/19  1848   INR 1.12   PT second(s) 14.6   PTT second(s) 36.5*   D-Dimer mcg/ml FEU 0.37     Recent Labs   Units 07/10/19  1406 08/12/19  0953 08/29/19  1848   Bili Total mg/dL 0.5 0.6 0.6   Bili Indirect mg/dL 0.4 0.5 0.5   Bili Direct mg/dL 0.1 0.1 0.1   AST U/L 20 19 25   ALT U/L 22 22 45*         ROS:   Review of Systems   Constitutional: Negative for chills, diaphoresis, fever and unexpected weight change.   Respiratory: Positive for shortness of breath. Negative for apnea, cough, choking, chest tightness, wheezing and stridor.    Cardiovascular: Positive for palpitations and leg swelling. Negative for chest pain.   Gastrointestinal: Negative.    Genitourinary: Negative.    Musculoskeletal: Negative.    Neurological: Negative.      Objective:   Physical Exam   Constitutional: She is oriented to person, place, and time. She appears well-developed and well-nourished. No distress.   HENT:   Head: Normocephalic and atraumatic.   Eyes: Pupils are equal, round, and reactive to light. No scleral icterus.   Neck: Thyromegaly present.    Cardiovascular: Normal rate, normal heart sounds and normal pulses. A regularly irregular rhythm present. Exam reveals no gallop and no friction rub.   No murmur heard.  Pulmonary/Chest: Effort normal and breath sounds normal. No stridor. No respiratory distress. She has no wheezes. She has no rales. She exhibits no tenderness.   Abdominal: Soft.   Musculoskeletal: Normal range of motion. She exhibits edema (1+ pitting BLE).   Neurological: She is alert and oriented to person, place, and time. She has normal strength. She displays a negative Romberg sign.   Skin: Skin is warm and dry. Capillary refill takes less than 2 seconds. No rash noted. She is not diaphoretic. No erythema.        Psychiatric: She has a normal mood and affect. Her behavior is normal. Judgment and thought content normal.   Nursing note and vitals reviewed.    Assessment:     1. Peripheral edema    2. Malignant melanoma of torso excluding breast    3. Atrial fibrillation, unspecified type    4. Shortness of breath    5. TEE (dyspnea on exertion)    6. Thyroiditis      Plan:     Problem List Items Addressed This Visit        Cardiac/Vascular    Atrial fibrillation    Overview     PT unsure of Cardiologist.          Current Assessment & Plan     Rate controlled on Metoprolol and Cardizem.  Currently on Hctz and Eliquis.  She is normally managed by [facility]       Will get Echo as requested by [facility] NP. Will get BNP and compare to recent ER visit (1800).      Plan to start Lasix 20-40 mg pending CMP.     Patient will go get labs today at [facility].  Hopefully she can be scheduled for an echocardiogram as well.  She will follow up in my office since 7 days to monitor treatment.       Will call her with the planned once I have received the labs and echocardiogram.    For now, the patient will continue medications she is currently taking. BP is controlled in office. Rate controlled as well.          Relevant Orders    CBC auto  differential    Magnesium    Comprehensive metabolic panel    Brain natriuretic peptide    CK    Echo       Oncology    Malignant melanoma of torso excluding breast       Endocrine    Thyroiditis    Overview     T3 Free 12.0 (H)     T4 Free 3.45 (H)     TSH <0.01 (L)            Current Assessment & Plan     Defer workup/treatment to [facility]. Labs reviewed from recent hospital encounter.            Other Visit Diagnoses     Peripheral edema    -  Primary    Relevant Orders    CBC auto differential    Magnesium    Comprehensive metabolic panel    Brain natriuretic peptide    CK    Echo    Shortness of breath        Relevant Orders    CBC auto differential    Magnesium    Comprehensive metabolic panel    Brain natriuretic peptide    CK    Echo    TEE (dyspnea on exertion)        Relevant Orders    CBC auto differential    Magnesium    Comprehensive metabolic panel    Brain natriuretic peptide    CK    Echo        T3 Free 12.0 (H)     T4 Free 3.45 (H)     TSH <0.01 (L)     NT ProBNP 1,825 (H)           CT PE protocol on 8/30 without PE or evidence of infection. BNP done in ER was elevated. Cardiac enzymes normal. We will order ECHO today given this in setting of hx of HTN and extremity edema.          Examination: CT CHEST PULMONARY EMBOLISM W CONTRAST, 8/30/2019 2:41 AM    Clinical History: Melanoma of vulva    Indication: Chest pain, shortness of breath    Comparison: CT chest/abdomen/pelvis with contrast dated 3/26/2019 and PET/CT dated 7/17/2019    Technique: Spiral CT of the chest is performed using intravenous contrast.    Findings:     Lower neck: Diffuse enlargement of thyroid gland is new from 7/17/2019 and suggestive of thyroiditis, potentially secondary to drug reaction. No focal discrete nodule or adjacent lymphadenopathy.    Axilla:Normal.    Airway:Normal.    Mediastinum:Normal.    Heart and great vessels:Opacification of the pulmonary arteries is adequate for assessment of pulmonary embolism. No  pulmonary embolism visualized. No right heart strain. No pericardial effusion.    Lungs and pleura:Scattered linear opacities consistent with scarring and/or subsegmental atelectasis. Small nodule along the minor fissure on image 94 stable from March 2019 and most likely a subpleural lymph node. No specific evidence of metastatic lung nodule. No pleural fluid.    Upper abdomen:Unremarkable where visualized. Evaluation is limited secondary to contrast bolus timing.    Osseous structures:Normal.    Soft tissues:Unremarkable.    IMPRESSION:  1. No evidence of pulmonary embolus and no evidence of metastatic disease.  2. New diffuse thyroid enlargement is suggestive of thyroiditis, indeterminate in etiology but potentially drug related.

## 2019-09-11 NOTE — TELEPHONE ENCOUNTER
M for pt 9/11/19 0934 to let her know Rosi barajas/ [facility] scheduling made her echo apt for today at 1:30pm. Left all info on VM. Called x2.

## 2019-09-12 ENCOUNTER — TELEPHONE (OUTPATIENT)
Dept: FAMILY MEDICINE | Facility: CLINIC | Age: 74
End: 2019-09-12

## 2019-09-12 DIAGNOSIS — E27.40 ADRENAL INSUFFICIENCY: ICD-10-CM

## 2019-09-12 DIAGNOSIS — E06.9 THYROIDITIS: ICD-10-CM

## 2019-09-12 DIAGNOSIS — I27.20 PULMONARY HYPERTENSION: Primary | ICD-10-CM

## 2019-09-12 DIAGNOSIS — I48.91 ATRIAL FIBRILLATION, UNSPECIFIED TYPE: ICD-10-CM

## 2019-09-12 NOTE — TELEPHONE ENCOUNTER
Spoke to Tiffanie barajas/ [facility] @ 1243 regarding patient's echo findings and labs. Severe pulmonary HTN noted as well as severe TR on Echo.  Labs and echo report faxed to number provided. Tiffanie SLOAN states she will send information to patient's outpatient MD.  She will call back with further instructions.     Records faxed to the number provided.     . Improved from 1825 during ER visit.           Spoke to Tiffanie SLOAN @ 1643.  She has not heard from outpatient MD. Apparently the patient was instructed to take Hydrocortisone for adrenal insuff. Will call the pt and start Lasix 20 mg to prevent HF.     1654 - Left voicemail on pt's phone.  Will call again in AM. Plan to start Lasix 20mg daily.     Will follow up with Tiffanie SLOAN again in the AM regarding pt's treatment.     9/13/19 @ 2370 - spoke to NP Merari @ [facility] regarding hydrocortisone RX. Will schedule pt with cardio here regarding Pulm HTN.     09/13/19 @ 9953 - Spoke to pt.  She has not filled the Hydrocortisone Rx that was prescribed by [facility] provider.  Lasix 20mg sent to [facility] for pulm htn mgmt. Currently denies SOB, TEE, CP, palpitations, cough. + peripheral edema.  She will reschedule to see me on Monday.  Report to ER over the weekend if SOB, TEE, weight gain >3 lbs, cough, fever, chills, CP.  Pt voiced understanding.     Sending Cardio referral on her behalf for Pulm htn. Also sending referral to Endocrinology given her next apt

## 2019-09-13 RX ORDER — FUROSEMIDE 20 MG/1
20 TABLET ORAL DAILY
Qty: 30 TABLET | Refills: 0 | Status: SHIPPED | OUTPATIENT
Start: 2019-09-13 | End: 2023-09-27

## 2019-09-16 ENCOUNTER — OFFICE VISIT (OUTPATIENT)
Dept: FAMILY MEDICINE | Facility: CLINIC | Age: 74
End: 2019-09-16
Payer: MEDICARE

## 2019-09-16 VITALS
HEIGHT: 68 IN | BODY MASS INDEX: 28.64 KG/M2 | DIASTOLIC BLOOD PRESSURE: 60 MMHG | SYSTOLIC BLOOD PRESSURE: 99 MMHG | WEIGHT: 189 LBS | OXYGEN SATURATION: 96 % | HEART RATE: 81 BPM | RESPIRATION RATE: 16 BRPM

## 2019-09-16 DIAGNOSIS — I48.91 ATRIAL FIBRILLATION, UNSPECIFIED TYPE: ICD-10-CM

## 2019-09-16 DIAGNOSIS — E27.40 ADRENAL INSUFFICIENCY: ICD-10-CM

## 2019-09-16 DIAGNOSIS — I27.20 PULMONARY HYPERTENSION: Primary | ICD-10-CM

## 2019-09-16 PROCEDURE — 99213 OFFICE O/P EST LOW 20 MIN: CPT | Mod: S$GLB,,, | Performed by: NURSE PRACTITIONER

## 2019-09-16 PROCEDURE — 99213 PR OFFICE/OUTPT VISIT, EST, LEVL III, 20-29 MIN: ICD-10-PCS | Mod: S$GLB,,, | Performed by: NURSE PRACTITIONER

## 2019-09-16 PROCEDURE — 1101F PR PT FALLS ASSESS DOC 0-1 FALLS W/OUT INJ PAST YR: ICD-10-PCS | Mod: CPTII,S$GLB,, | Performed by: NURSE PRACTITIONER

## 2019-09-16 PROCEDURE — 3074F PR MOST RECENT SYSTOLIC BLOOD PRESSURE < 130 MM HG: ICD-10-PCS | Mod: CPTII,S$GLB,, | Performed by: NURSE PRACTITIONER

## 2019-09-16 PROCEDURE — 3078F DIAST BP <80 MM HG: CPT | Mod: CPTII,S$GLB,, | Performed by: NURSE PRACTITIONER

## 2019-09-16 PROCEDURE — 3074F SYST BP LT 130 MM HG: CPT | Mod: CPTII,S$GLB,, | Performed by: NURSE PRACTITIONER

## 2019-09-16 PROCEDURE — 3078F PR MOST RECENT DIASTOLIC BLOOD PRESSURE < 80 MM HG: ICD-10-PCS | Mod: CPTII,S$GLB,, | Performed by: NURSE PRACTITIONER

## 2019-09-16 PROCEDURE — 1101F PT FALLS ASSESS-DOCD LE1/YR: CPT | Mod: CPTII,S$GLB,, | Performed by: NURSE PRACTITIONER

## 2019-09-16 NOTE — PROGRESS NOTES
Subjective:      Patient ID: Bonny Luciano is a 73 y.o. female.    Chief Complaint: No chief complaint on file.         The patient is here today to discuss her recent Echocardiogram and lab results.  She was originally seen last Thursday as a request by a provider at [facility].  She was noted to have some thyroiditis.  Patient presented for cycle 2 of ipilimumab + nivolumab. She had multiple complaints including profound fatigue, anxiety, weight loss, diaphoresis, intermittent diarrha.  Her cycle 2 was held and the patient was scheduled to return in 3 weeks for evaluation.  Prior to this, the patient was seen in the emergency room on 8/29/19 for shortness of breath.  She had extensive workup which included labs and CT angiogram to rule out pulmonary embolism.  This was reportedly negative at the time.  She was discharged subsequently after her symptoms improve.  Her current symptoms include TEE, SOB, and bilateral peripheral edema.  Edema worse in the morning. Wearing compression hose daily. Denies intermittent claudication.  No new medicine changes. She is managed by [facility] Cardiologist, which she is unsure of the name.  She denies chest pain, productive cough, orthopnea, neck pain, scapular pain, jaw pain, shoulder pain, palpitations.   She continues to have peripheral edema today and TEE > 100 ft. She was started on hydrocortisone over the weekend for adrenal insufficiency. Lasix was called out for her, but the patient did not start the medication. Referrals were sent out on her behalf, but  She has yet to hear from anyone. There are no new issues today.       ROS:   Review of Systems   Constitutional: Negative for chills, diaphoresis, fever and unexpected weight change.   Respiratory: Positive for shortness of breath. Negative for apnea, cough, choking, chest tightness, wheezing and stridor.    Cardiovascular: Positive for leg swelling. Negative for chest pain and palpitations.   Gastrointestinal: Negative.     Genitourinary: Negative.    Musculoskeletal: Negative.    Neurological: Negative.      Objective:   Physical Exam   Constitutional: She is oriented to person, place, and time. She appears well-developed and well-nourished. No distress.   HENT:   Head: Normocephalic and atraumatic.   Eyes: Pupils are equal, round, and reactive to light. No scleral icterus.   Neck: Thyromegaly present.   Cardiovascular: Normal rate, normal heart sounds and normal pulses. A regularly irregular rhythm present. Exam reveals no gallop and no friction rub.   No murmur heard.  Pulmonary/Chest: Effort normal and breath sounds normal. No stridor. No respiratory distress. She has no wheezes. She has no rales. She exhibits no tenderness.   Abdominal: Soft.   Musculoskeletal: Normal range of motion. She exhibits edema (1+ pitting BLE).   Neurological: She is alert and oriented to person, place, and time. She has normal strength. She displays a negative Romberg sign.   Skin: Skin is warm and dry. Capillary refill takes less than 2 seconds. No rash noted. She is not diaphoretic. No erythema.        Psychiatric: She has a normal mood and affect. Her behavior is normal. Judgment and thought content normal.   Nursing note and vitals reviewed.    Assessment:     1. Pulmonary hypertension    2. Atrial fibrillation, unspecified type    3. Adrenal insufficiency      Plan:     Problem List Items Addressed This Visit        Pulmonary    Pulmonary hypertension - Primary    Overview     See Echo report         Current Assessment & Plan     Referral sent out on Friday for Pulm, Cardio, and Endo in the interim.     Explained to patient and  that her Adrenal insufficiency is complicating typical management of her Pulm HTN. It would be optimal for her to be admitted at [facility] where a team can manage her issues as inpt.      PT is accompanied by her .  He requests that I contact the provider at [facility] for apt. He will call on his own  this evening for the next available apt.     Pt given Lasix 20mg to take if SOB, TEE, weight gain > 5 lbs, or any symptoms similar to her last ER visit occurred. Instructed her to go to ER if worsening symptoms.  Wife and  voiced understanding.             Cardiac/Vascular    Atrial fibrillation    Overview     PT unsure of Cardiologist.            Other Visit Diagnoses     Adrenal insufficiency        Continue hydrocortisone as instructed by [facility] provider.       Labs and echo reviewed w/ pt and .  Spoke to Merari SLOAN w/ [facility]. They will call pt for apt on Wednesday.

## 2019-09-16 NOTE — ASSESSMENT & PLAN NOTE
Referral sent out on Friday for Pulm, Cardio, and Endo in the interim.     Explained to patient and  that her Adrenal insufficiency is complicating typical management of her Pulm HTN. It would be optimal for her to be admitted at [facility] where a team can manage her issues as inpt.      PT is accompanied by her .  He requests that I contact the provider at [facility] for apt. He will call on his own this evening for the next available apt.     Pt given Lasix 20mg to take if SOB, TEE, weight gain > 5 lbs, or any symptoms similar to her last ER visit occurred. Instructed her to go to ER if worsening symptoms.  Wife and  voiced understanding.     Advised to avoid sodium at all costs.

## 2019-09-17 ENCOUNTER — TELEPHONE (OUTPATIENT)
Dept: FAMILY MEDICINE | Facility: CLINIC | Age: 74
End: 2019-09-17

## 2019-09-17 NOTE — TELEPHONE ENCOUNTER
0854am Pt called office stating she's having SOB, but declines going to ER and says she'll be fine. Has apt at [facility] tomorrow at 830am and will f/u with us after. Instructed to go to ER if worsens - FYI.

## 2019-09-19 NOTE — TELEPHONE ENCOUNTER
Per nurse Pilar referral received. She's tried calling the pt once and will try again. Said she will get it taken care of

## 2020-04-02 ENCOUNTER — TELEPHONE (OUTPATIENT)
Dept: FAMILY MEDICINE | Facility: CLINIC | Age: 75
End: 2020-04-02

## 2020-04-02 DIAGNOSIS — R30.0 DYSURIA: Primary | ICD-10-CM

## 2020-04-02 RX ORDER — CIPROFLOXACIN 500 MG/1
500 TABLET ORAL 2 TIMES DAILY
Qty: 10 TABLET | Refills: 0 | Status: SHIPPED | OUTPATIENT
Start: 2020-04-02 | End: 2020-04-07

## 2020-04-06 ENCOUNTER — TELEPHONE (OUTPATIENT)
Dept: FAMILY MEDICINE | Facility: CLINIC | Age: 75
End: 2020-04-06

## 2020-04-06 NOTE — TELEPHONE ENCOUNTER
LVM for pt that we sent her urine order to [place] and that her cipro has been sent to [facility].

## 2020-04-09 ENCOUNTER — TELEPHONE (OUTPATIENT)
Dept: FAMILY MEDICINE | Facility: CLINIC | Age: 75
End: 2020-04-09

## 2020-04-09 NOTE — TELEPHONE ENCOUNTER
Urinalysis showed    1+ blood  Nitrite negative  Leukocyte esterase 4+    Microscopic reflex   greater than 100 WBC  5-10 RBC    Urine culture showed no growth in 24 hr.   Recommend the patient discontinue the medication.  Given her history of vulva malignancy, would recommend her discussed her symptoms with Gynecology and/or Oncology. This can be inflammation in the urogenital region vs nephro.     Stop Antibiotic

## 2020-09-24 ENCOUNTER — OFFICE VISIT (OUTPATIENT)
Dept: FAMILY MEDICINE | Facility: CLINIC | Age: 75
End: 2020-09-24
Payer: MEDICARE

## 2020-09-24 VITALS
DIASTOLIC BLOOD PRESSURE: 64 MMHG | WEIGHT: 198.81 LBS | BODY MASS INDEX: 30.13 KG/M2 | HEART RATE: 78 BPM | HEIGHT: 68 IN | OXYGEN SATURATION: 98 % | SYSTOLIC BLOOD PRESSURE: 138 MMHG

## 2020-09-24 DIAGNOSIS — Z09 HOSPITAL DISCHARGE FOLLOW-UP: ICD-10-CM

## 2020-09-24 DIAGNOSIS — R10.31 RLQ ABDOMINAL PAIN: Primary | ICD-10-CM

## 2020-09-24 DIAGNOSIS — R35.1 NOCTURIA: ICD-10-CM

## 2020-09-24 DIAGNOSIS — I10 HYPERTENSION, UNSPECIFIED TYPE: ICD-10-CM

## 2020-09-24 PROCEDURE — 3075F SYST BP GE 130 - 139MM HG: CPT | Mod: CPTII,S$GLB,, | Performed by: NURSE PRACTITIONER

## 2020-09-24 PROCEDURE — 3008F BODY MASS INDEX DOCD: CPT | Mod: CPTII,S$GLB,, | Performed by: NURSE PRACTITIONER

## 2020-09-24 PROCEDURE — 3078F PR MOST RECENT DIASTOLIC BLOOD PRESSURE < 80 MM HG: ICD-10-PCS | Mod: CPTII,S$GLB,, | Performed by: NURSE PRACTITIONER

## 2020-09-24 PROCEDURE — 99214 PR OFFICE/OUTPT VISIT, EST, LEVL IV, 30-39 MIN: ICD-10-PCS | Mod: S$GLB,,, | Performed by: NURSE PRACTITIONER

## 2020-09-24 PROCEDURE — 3075F PR MOST RECENT SYSTOLIC BLOOD PRESS GE 130-139MM HG: ICD-10-PCS | Mod: CPTII,S$GLB,, | Performed by: NURSE PRACTITIONER

## 2020-09-24 PROCEDURE — 3008F PR BODY MASS INDEX (BMI) DOCUMENTED: ICD-10-PCS | Mod: CPTII,S$GLB,, | Performed by: NURSE PRACTITIONER

## 2020-09-24 PROCEDURE — 3078F DIAST BP <80 MM HG: CPT | Mod: CPTII,S$GLB,, | Performed by: NURSE PRACTITIONER

## 2020-09-24 PROCEDURE — 1159F MED LIST DOCD IN RCRD: CPT | Mod: S$GLB,,, | Performed by: NURSE PRACTITIONER

## 2020-09-24 PROCEDURE — 1159F PR MEDICATION LIST DOCUMENTED IN MEDICAL RECORD: ICD-10-PCS | Mod: S$GLB,,, | Performed by: NURSE PRACTITIONER

## 2020-09-24 PROCEDURE — 99214 OFFICE O/P EST MOD 30 MIN: CPT | Mod: S$GLB,,, | Performed by: NURSE PRACTITIONER

## 2020-09-24 RX ORDER — BUDESONIDE AND FORMOTEROL FUMARATE DIHYDRATE 160; 4.5 UG/1; UG/1
AEROSOL RESPIRATORY (INHALATION)
COMMUNITY
End: 2023-09-27

## 2020-09-24 RX ORDER — METFORMIN HYDROCHLORIDE 500 MG/1
500 TABLET ORAL 2 TIMES DAILY WITH MEALS
COMMUNITY
Start: 2019-12-16

## 2020-09-24 RX ORDER — HYDROCORTISONE 5 MG/1
5 TABLET ORAL
COMMUNITY

## 2020-09-24 RX ORDER — LEVOTHYROXINE SODIUM 75 UG/1
75 TABLET ORAL
COMMUNITY
Start: 2019-10-11

## 2020-09-24 RX ORDER — GLIPIZIDE 10 MG/1
5 TABLET ORAL
COMMUNITY
End: 2023-09-27

## 2020-09-24 RX ORDER — PREDNISOLONE SODIUM PHOSPHATE 10 MG/ML
1 SOLUTION/ DROPS OPHTHALMIC
COMMUNITY
End: 2023-09-27

## 2020-09-24 RX ORDER — OMEPRAZOLE 20 MG/1
20 CAPSULE, DELAYED RELEASE ORAL
COMMUNITY
End: 2023-09-27

## 2020-09-24 RX ORDER — MONTELUKAST SODIUM 10 MG/1
TABLET ORAL
COMMUNITY
End: 2023-09-27

## 2020-09-24 NOTE — ASSESSMENT & PLAN NOTE
She reports that her blood pressure in the morning is 150s systolic since being discharged from the hospital.  This is likely secondary to the discontinuation of her evening dose of metoprolol.     Hesitant to make any medication adjustments at this point given her recent hospitalization secondary to dehydration and bradycardia.  Will continue to monitor BP    Rate is controlled in the office.  BP controlled in the office.     Patient is on hydrocortisone 3 tablets in the a.m. and 2 tablets in the p.m.  she may be able to address this with her endocrinologist in October
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
## 2020-09-28 ENCOUNTER — TELEPHONE (OUTPATIENT)
Dept: FAMILY MEDICINE | Facility: CLINIC | Age: 75
End: 2020-09-28

## 2020-09-28 NOTE — TELEPHONE ENCOUNTER
9/28/20 @ 1325     Amylase 26  Lipase 19  CMP unremarkable    CBC     Wbc wnl   Slight lymphocytopenia - ? Recent viral syndrome ?   Otherwise unremarkable     U/A   Small leuk est  No wbc  No rbc  Nitrite neg   Bact 3+   No reflex.     Call placed to pt. She reports resolution of abdominal pain. Will continue to monitor. If ab pain returns, will pursue imaging.

## 2020-10-16 ENCOUNTER — TELEPHONE (OUTPATIENT)
Dept: FAMILY MEDICINE | Facility: CLINIC | Age: 75
End: 2020-10-16

## 2020-10-16 NOTE — TELEPHONE ENCOUNTER
----- Message from Kayla Dave sent at 10/16/2020 11:29 AM CDT -----  Regarding: pt  Pt would like to be seen today. Pt is not feeling well. Please call back [phone]

## 2020-10-30 ENCOUNTER — PATIENT MESSAGE (OUTPATIENT)
Dept: ADMINISTRATIVE | Facility: HOSPITAL | Age: 75
End: 2020-10-30

## 2020-11-23 ENCOUNTER — TELEPHONE (OUTPATIENT)
Dept: FAMILY MEDICINE | Facility: CLINIC | Age: 75
End: 2020-11-23

## 2020-11-23 NOTE — TELEPHONE ENCOUNTER
Pt saw Dr. Liu last September and states her [facility] doctor told her Dr. Liu misdiagnosed her. She was trying to get an apt w/ him again, but his next opening isn't until late JAN 2020. She states she has SOB and red spots on BLE. It's not painful or itchy.    Before hanging up pt stated [facility] will put in a cardio consult to someone.

## 2020-11-23 NOTE — TELEPHONE ENCOUNTER
----- Message from Skylar Mota sent at 11/23/2020 10:46 AM CST -----  Regarding: Cardiology referral  Contact: Patient  Please call patient concerning getting a referral for cardiology. Please call at Ph .[phone] (home)

## 2020-12-01 ENCOUNTER — PATIENT OUTREACH (OUTPATIENT)
Dept: ADMINISTRATIVE | Facility: HOSPITAL | Age: 75
End: 2020-12-01

## 2020-12-01 NOTE — PROGRESS NOTES
[facility] Exam Report 12/2020    Pt [facility]

## 2020-12-14 ENCOUNTER — OFFICE VISIT (OUTPATIENT)
Dept: OBSTETRICS AND GYNECOLOGY | Facility: CLINIC | Age: 75
End: 2020-12-14
Payer: MEDICARE

## 2020-12-14 VITALS
HEART RATE: 96 BPM | WEIGHT: 195.38 LBS | HEIGHT: 68 IN | SYSTOLIC BLOOD PRESSURE: 109 MMHG | DIASTOLIC BLOOD PRESSURE: 73 MMHG | BODY MASS INDEX: 29.61 KG/M2

## 2020-12-14 DIAGNOSIS — Z01.419 WOMEN'S ANNUAL ROUTINE GYNECOLOGICAL EXAMINATION: Primary | ICD-10-CM

## 2020-12-14 DIAGNOSIS — C52 VAGINAL MELANOMA: ICD-10-CM

## 2020-12-14 PROCEDURE — G0101 PR CA SCREEN;PELVIC/BREAST EXAM: ICD-10-PCS | Mod: S$GLB,,, | Performed by: OBSTETRICS & GYNECOLOGY

## 2020-12-14 PROCEDURE — G0101 CA SCREEN;PELVIC/BREAST EXAM: HCPCS | Mod: S$GLB,,, | Performed by: OBSTETRICS & GYNECOLOGY

## 2020-12-14 RX ORDER — FLUTICASONE PROPIONATE AND SALMETEROL 250; 50 UG/1; UG/1
POWDER RESPIRATORY (INHALATION)
COMMUNITY
End: 2023-09-27

## 2020-12-15 NOTE — PROGRESS NOTES
"    Subjective:       Patient ID: Bonny Luciano is a 75 y.o. female.    Chief Complaint:  Annual Exam      History of Present Illness  Pt here for gyn annual.  History and past labs reviewed with patient.    Complaints none. Currently undergoing immunotherapy for malignant melanoma of the vagina.       Review of Systems  Review of Systems   Constitutional: Negative for chills and fever.   Respiratory: Negative for shortness of breath.    Cardiovascular: Negative for chest pain.   Gastrointestinal: Negative for abdominal pain, blood in stool, constipation, diarrhea, nausea, vomiting and reflux.   Genitourinary: Negative for dysmenorrhea, dysuria, hematuria, hot flashes, menstrual problem, pelvic pain, vaginal bleeding, vaginal discharge, postcoital bleeding and vaginal dryness.   Musculoskeletal: Negative for arthralgias and joint swelling.   Integumentary:  Positive for rash. Negative for hair changes, breast mass, nipple discharge and breast skin changes.   Psychiatric/Behavioral: Negative for depression. The patient is not nervous/anxious.    Breast: Negative for asymmetry, lump, mass, nipple discharge and skin changes          Objective:     Vitals:    12/14/20 1125   BP: 109/73   Pulse: 96   Weight: 88.6 kg (195 lb 6.4 oz)   Height: 5' 8" (1.727 m)       Physical Exam:   Constitutional: She appears well-developed and well-nourished. No distress.    HENT:   Head: Normocephalic and atraumatic.    Eyes: Conjunctivae and EOM are normal.    Neck: No tracheal deviation present. No thyromegaly present.    Cardiovascular: Exam reveals no clubbing, no cyanosis and no edema.     Pulmonary/Chest: Effort normal. No respiratory distress.        Abdominal: Soft. She exhibits no distension and no mass. There is no abdominal tenderness. There is no rebound and no guarding. No hernia.     Genitourinary:    Vagina, uterus and rectum normal.      Pelvic exam was performed with patient supine.   There is no rash, tenderness, " lesion or injury on the right labia. There is no rash, tenderness, lesion or injury on the left labia. Cervix is normal. Right adnexum displays no mass, no tenderness and no fullness. Left adnexum displays no mass, no tenderness and no fullness.    Genitourinary Comments: Multiple hyperpigmented areas involving both labia minora as well as the vaginal mucosa to the level of the cervix                   Skin: She is not diaphoretic. No cyanosis. Nails show no clubbing.         breast exam wnl- no nipple dc, skin changes, masses or lymph nodes palpated bilaterally    Assessment:        1. Women's annual routine gynecological examination    2. Vaginal melanoma                Plan:      Annual   Following at [facility]   Pap no longer indicated   Flu shot UTD   MMG ordered  TSH, Lipid panel, A1c   Colonoscopy UTD   RTC  1 year

## 2021-01-27 ENCOUNTER — TELEPHONE (OUTPATIENT)
Dept: FAMILY MEDICINE | Facility: CLINIC | Age: 76
End: 2021-01-27

## 2021-02-19 ENCOUNTER — TELEPHONE (OUTPATIENT)
Dept: FAMILY MEDICINE | Facility: CLINIC | Age: 76
End: 2021-02-19

## 2021-02-19 DIAGNOSIS — Z20.822 CONTACT WITH AND (SUSPECTED) EXPOSURE TO COVID-19: Primary | ICD-10-CM

## 2021-02-22 LAB
METHOD SPECIFIC: YES
SARS COV SOURCE: NORMAL
SARS-COV-2 RNA RESP QL NAA+PROBE: NEGATIVE

## 2021-08-02 ENCOUNTER — TELEPHONE (OUTPATIENT)
Dept: FAMILY MEDICINE | Facility: CLINIC | Age: 76
End: 2021-08-02

## 2021-08-02 DIAGNOSIS — N30.90 CYSTITIS: Primary | ICD-10-CM

## 2021-08-02 RX ORDER — CIPROFLOXACIN 500 MG/1
500 TABLET ORAL 2 TIMES DAILY
Qty: 20 TABLET | Refills: 0 | Status: SHIPPED | OUTPATIENT
Start: 2021-08-02 | End: 2021-08-12

## 2022-04-08 DIAGNOSIS — R06.02 SOB (SHORTNESS OF BREATH): Primary | ICD-10-CM

## 2022-06-14 ENCOUNTER — TELEPHONE (OUTPATIENT)
Dept: PULMONOLOGY | Facility: CLINIC | Age: 77
End: 2022-06-14
Payer: MEDICARE

## 2022-06-14 NOTE — TELEPHONE ENCOUNTER
Return patient call rescheduled appointment  is still in hospital in [place] he will have to stay longer we will her appointment up if able too. LB  ----- Message from Rosi Whitfield sent at 6/14/2022  8:26 AM CDT -----  Contact: self  Requesting a call back regarding rescheduling a new patient appointment (set for 6/16 currently). Please call back at [phone]

## 2022-12-12 ENCOUNTER — OFFICE VISIT (OUTPATIENT)
Dept: FAMILY MEDICINE | Facility: CLINIC | Age: 77
End: 2022-12-12
Payer: MEDICARE

## 2022-12-12 VITALS
BODY MASS INDEX: 28.89 KG/M2 | OXYGEN SATURATION: 98 % | WEIGHT: 190 LBS | DIASTOLIC BLOOD PRESSURE: 77 MMHG | SYSTOLIC BLOOD PRESSURE: 124 MMHG

## 2022-12-12 DIAGNOSIS — N32.81 OAB (OVERACTIVE BLADDER): ICD-10-CM

## 2022-12-12 DIAGNOSIS — R30.0 DYSURIA: Primary | ICD-10-CM

## 2022-12-12 PROCEDURE — 3078F PR MOST RECENT DIASTOLIC BLOOD PRESSURE < 80 MM HG: ICD-10-PCS | Mod: CPTII,S$GLB,, | Performed by: NURSE PRACTITIONER

## 2022-12-12 PROCEDURE — 3074F SYST BP LT 130 MM HG: CPT | Mod: CPTII,S$GLB,, | Performed by: NURSE PRACTITIONER

## 2022-12-12 PROCEDURE — 1159F MED LIST DOCD IN RCRD: CPT | Mod: CPTII,S$GLB,, | Performed by: NURSE PRACTITIONER

## 2022-12-12 PROCEDURE — 3078F DIAST BP <80 MM HG: CPT | Mod: CPTII,S$GLB,, | Performed by: NURSE PRACTITIONER

## 2022-12-12 PROCEDURE — 3074F PR MOST RECENT SYSTOLIC BLOOD PRESSURE < 130 MM HG: ICD-10-PCS | Mod: CPTII,S$GLB,, | Performed by: NURSE PRACTITIONER

## 2022-12-12 PROCEDURE — 99213 OFFICE O/P EST LOW 20 MIN: CPT | Mod: S$GLB,,, | Performed by: NURSE PRACTITIONER

## 2022-12-12 PROCEDURE — 99213 PR OFFICE/OUTPT VISIT, EST, LEVL III, 20-29 MIN: ICD-10-PCS | Mod: S$GLB,,, | Performed by: NURSE PRACTITIONER

## 2022-12-12 PROCEDURE — 1159F PR MEDICATION LIST DOCUMENTED IN MEDICAL RECORD: ICD-10-PCS | Mod: CPTII,S$GLB,, | Performed by: NURSE PRACTITIONER

## 2022-12-12 RX ORDER — MIRABEGRON 25 MG/1
25 TABLET, FILM COATED, EXTENDED RELEASE ORAL DAILY
Qty: 30 TABLET | Refills: 11 | Status: SHIPPED | OUTPATIENT
Start: 2022-12-12 | End: 2023-09-27

## 2022-12-12 RX ORDER — CIPROFLOXACIN 500 MG/1
500 TABLET ORAL 2 TIMES DAILY
Qty: 10 TABLET | Refills: 0 | Status: SHIPPED | OUTPATIENT
Start: 2022-12-12 | End: 2022-12-17

## 2022-12-12 NOTE — PROGRESS NOTES
Subjective:      Patient ID: Bonny Luciano is a 77 y.o. female.    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          INTERVAL HISTORY SINCE THE LAST VISIT   According to the most recent oncology note, dated May 9, 2022, as of that date the patient was applying imiquimod cream for melanoma in situ of the vulva.    Open mitral valve replacement June 28, 2022. The patient says her dyspnea has improved but is still present to some degree    Is patient on high-dose glucocorticoids? no.    Diabetes medicines: Metformin 500 mg twice daily. Empagliflozin 12.5 mg daily. The patient ran out of dulaglutide in June 2022, and did not renew the prescription..  Glucose monitoring?    Blood pressure medicines: Losartan 50 mg daily. Carvedilol 12.5 mg twice daily..    Lipid medicines: Atorvastatin 20 mg daily.    Thyroid hormone? Levothyroxine 75 mcg daily..    The patient says another physician prescribed sodium polystyrene sulfonate (Kayexalate), which she started taking only yesterday.    Maintenance hydrocortisone? 15 mg in the morning and 10 mg in the afternoon        The patient is here today for a problem visit. She has not been seen in our clinic since 2019.  Here with complaints of dysuria with onset 4 days ago.  She reports vaginal swelling and burning pain. Denies discharge. No foul odor reported. Denies hematuria, flank pain, supapubic pain. Denies fever, chills, back pain.  She is requesting to get back on OAB treatment. States ditropan no longer works for her.     No other issues reported.           Past Medical History:   Diagnosis Date    Anticoagulant long-term use     Asthma     Bladder disorder     Bladder problem     GERD (gastroesophageal reflux disease)     Hyperlipidemia      Hypertension     Malignant melanoma of torso excluding breast 3/26/2019    Melanoma in situ of torso excluding breast 4/17/2019      Social History     Socioeconomic History    Marital status:    Tobacco Use    Smoking status: Never    Smokeless tobacco: Never   Substance and Sexual Activity    Alcohol use: Yes     Comment: X mas, New year, Birthday    Drug use: No    Sexual activity: Yes      Family History   Problem Relation Age of Onset    Diabetes Mother         ROS:   Review of Systems   Constitutional:  Negative for appetite change, chills, diaphoresis, fatigue, fever and unexpected weight change.   Respiratory:  Negative for apnea, cough, choking, chest tightness, shortness of breath, wheezing and stridor.    Cardiovascular:  Positive for leg swelling (chronic BLE). Negative for chest pain and palpitations.   Gastrointestinal:  Negative for abdominal distention, anal bleeding, blood in stool, constipation, diarrhea, nausea, rectal pain and vomiting.   Endocrine: Negative for polydipsia, polyphagia and polyuria.   Genitourinary:  Positive for dysuria and frequency. Negative for decreased urine volume, difficulty urinating, dyspareunia, flank pain, hematuria, menstrual problem, pelvic pain, urgency, vaginal bleeding, vaginal discharge and vaginal pain.   Musculoskeletal: Negative.    Skin:  Negative for rash.   Allergic/Immunologic: Positive for immunocompromised state. Negative for environmental allergies and food allergies.   Neurological: Negative.    Hematological: Negative.    Objective:   Physical Exam  Vitals and nursing note reviewed.   Constitutional:       General: She is not in acute distress.     Appearance: She is well-developed. She is not ill-appearing or diaphoretic.   HENT:      Head: Normocephalic and atraumatic.   Eyes:      General: No scleral icterus.     Pupils: Pupils are equal, round, and reactive to light.   Neck:      Thyroid: Thyromegaly present.   Cardiovascular:      Rate and  Rhythm: Normal rate. Rhythm regularly irregular.      Pulses: Normal pulses.      Heart sounds: Normal heart sounds. No murmur heard.    No friction rub. No gallop.   Pulmonary:      Effort: Pulmonary effort is normal.   Abdominal:      General: Bowel sounds are increased. There is no distension.      Palpations: Abdomen is soft. There is no mass.      Tenderness: There is no abdominal tenderness. There is no right CVA tenderness, left CVA tenderness, guarding or rebound. Negative signs include Hernandez's sign and McBurney's sign.      Hernia: No hernia is present.   Musculoskeletal:         General: Normal range of motion.   Skin:     General: Skin is warm and dry.      Capillary Refill: Capillary refill takes less than 2 seconds.      Findings: No erythema or rash.          Neurological:      Mental Status: She is alert and oriented to person, place, and time.   Psychiatric:         Behavior: Behavior normal.         Thought Content: Thought content normal.         Judgment: Judgment normal.     Assessment:     1. Dysuria    2. OAB (overactive bladder)      No images are attached to the encounter.   Plan:     Problem List Items Addressed This Visit    None  Visit Diagnoses       Dysuria    -  Primary    She will get UA w/ Culture. Start empiric therapy and call her w/ resutls in 2-3 days. This may be fungal vs Atrophic. Will change meds as needed.     Relevant Medications    ciprofloxacin HCl (CIPRO) 500 MG tablet    Other Relevant Orders    Urinalysis, Reflex to Urine Culture Urine, Clean Catch    OAB (overactive bladder)        Tried and failed Ditropan 10 mg. PT given a 7 days sample of Myrbetriq 25 mg.     Relevant Medications    mirabegron (MYRBETRIQ) 25 mg Tb24 ER tablet            ENDOCRINOLOGY NOTE =     Check UA & urinary albumin 10/4/2022. As noted above, I suspect the rise of serum creatinine between December 2021 and now may be related to treatment with empagliflozin. I encouraged her to continue that  "medicine, because of her history of heart failure.  Addendum 9/6/2022: UA showed microalbuminuria. Patient is already on ARB andf SGLT2 inhibitor. UA also showed 24 RBC/hpf without other abnormality on microscopic exam. I don't know if the RBC reflect atrophic vaginitis, or urinary tract abnormality. I will repeat UA at next lab appointment on 10/4/2022.        Procedures     No visits with results within 1 Month(s) from this visit.   Latest known visit with results is:   Orders Only on 02/19/2021   Component Date Value Ref Range Status    Specimen Source 02/20/2021 NASOPHARYNGEAL   Final    SARS-CoV2 (COVID-19) Qualitative P* 02/20/2021 NEGATIVE  NEGATIVE Final    Comment: The specimen is NEGATIVE for SARS-CoV-2, the coronavirus associated  with COVID-19. A negative result does not rule out the possibility of  COVID-19 and should not be used as the sole basis for patient  management decisions.  This test has been authorized by the FDA under an Emergency Use  Authorization (EUA) for use by authorized laboratories.    Please review the "Fact Sheets" for health care providers and patients  available at [email or web address].      Method Specific 02/20/2021 YES   Final    Comment: NOTE  Testing performed at:  [facility], [address] [place] [address] CLIA #:[id number]          No results found in the last 30 days.     All diagnostic data (labs/imaging) was reviewed with the patient and/or family member in the room.  All questions were answered to their liking. The patient and/or family member voiced understanding of all instructions provided. Expectations regarding follow up and treatment plan were voiced and confirmed prior to departure. The patient was given orders/instructions at the end of the visit for reference. They were instructed to notify my office if they have not been contacted for imaging/referrals/labs/results in 1-2 weeks. They voiced understanding of all of the above.     Follow up: "     There are no Patient Instructions on file for this visit.     Follow up in about 6 months (around 6/12/2023), or if symptoms worsen or fail to improve.

## 2022-12-14 LAB
AMORPH URATE CRY URNS QL MICRO: NEGATIVE
BACTERIA #/AREA URNS HPF: ABNORMAL /[HPF]
BILIRUB UR QL STRIP: NEGATIVE
CLARITY UR: ABNORMAL
COLOR UR: YELLOW
EPITHELIAL CELLS: ABNORMAL
GLUCOSE (UA): 1000 MG/DL
KETONES UR QL STRIP: NEGATIVE MG/DL
LEUKOCYTE ESTERASE UR QL STRIP: ABNORMAL
MUCOUS THREADS URNS QL MICRO: NEGATIVE
NITRITE UR QL STRIP: POSITIVE
OCCULT BLOOD: ABNORMAL
PH, URINE: 5 (ref 5–7.5)
PROT UR QL STRIP: 75 MG/DL
RBC/HPF: ABNORMAL
SP GR UR STRIP: 1.01 (ref 1–1.03)
URINE CULTURE, ROUTINE: NORMAL
UROBILINOGEN, URINE: NORMAL E.U./DL (ref 0–1)
WBC/HPF: ABNORMAL

## 2022-12-19 RX ORDER — AMOXICILLIN AND CLAVULANATE POTASSIUM 875; 125 MG/1; MG/1
1 TABLET, FILM COATED ORAL 2 TIMES DAILY
Qty: 14 TABLET | Refills: 0 | Status: SHIPPED | OUTPATIENT
Start: 2022-12-19 | End: 2022-12-26

## 2022-12-22 ENCOUNTER — TELEPHONE (OUTPATIENT)
Dept: FAMILY MEDICINE | Facility: CLINIC | Age: 77
End: 2022-12-22
Payer: MEDICARE

## 2023-02-06 ENCOUNTER — TELEPHONE (OUTPATIENT)
Dept: FAMILY MEDICINE | Facility: CLINIC | Age: 78
End: 2023-02-06
Payer: MEDICARE

## 2023-02-06 NOTE — TELEPHONE ENCOUNTER
----- Message from Eda Clemens sent at 2/2/2023 11:55 AM CST -----    ----- Message -----  From: Ana Wu  Sent: 2/2/2023  11:48 AM CST  To: [facility] Cast Staff    Type:  Needs Medical Advice    Who Called: Bonny Luciano    Symptoms (please be specific): -   How long has patient had these symptoms:  -  Pharmacy name and phone #:  -  Would the patient rather a call back or a response via [facility]?   Best Call Back Number: [phone]    Additional Information:  pt would like to speak w/ Nina

## 2023-03-06 ENCOUNTER — TELEPHONE (OUTPATIENT)
Dept: FAMILY MEDICINE | Facility: CLINIC | Age: 78
End: 2023-03-06
Payer: MEDICARE

## 2023-03-06 DIAGNOSIS — R30.0 DYSURIA: Primary | ICD-10-CM

## 2023-03-06 RX ORDER — CIPROFLOXACIN 500 MG/1
500 TABLET ORAL 2 TIMES DAILY
Qty: 10 TABLET | Refills: 0 | Status: SHIPPED | OUTPATIENT
Start: 2023-03-06 | End: 2023-03-11

## 2023-03-06 NOTE — TELEPHONE ENCOUNTER
----- Message from Eda Clemens sent at 3/6/2023  2:57 PM CST -----  Can yall put in an UA order for her. I'll call her and let her know  ----- Message -----  From: Ana Wu  Sent: 3/6/2023   2:53 PM CST  To: Milly Cast Staff    Type:  Needs Medical Advice    Who Called: Bonny Luciano    Symptoms (please be specific): -   How long has patient had these symptoms:  -  Pharmacy name and phone #:    [facility] - [place], [place] - [address]  [address] [address]  Phone: [phone] Fax: [phone]      Would the patient rather a call back or a response via [facility]?    Best Call Back Number: [phone]    Additional Information: pt says she still has the UTI and needs antibiotics called in to her pharmacy, please call

## 2023-03-09 ENCOUNTER — OFFICE VISIT (OUTPATIENT)
Dept: OBSTETRICS AND GYNECOLOGY | Facility: CLINIC | Age: 78
End: 2023-03-09
Payer: MEDICARE

## 2023-03-09 VITALS
DIASTOLIC BLOOD PRESSURE: 85 MMHG | HEART RATE: 82 BPM | SYSTOLIC BLOOD PRESSURE: 148 MMHG | BODY MASS INDEX: 29.5 KG/M2 | WEIGHT: 194 LBS

## 2023-03-09 DIAGNOSIS — N95.2 VAGINAL ATROPHY: ICD-10-CM

## 2023-03-09 DIAGNOSIS — N90.89 VULVAR IRRITATION: Primary | ICD-10-CM

## 2023-03-09 PROCEDURE — 3077F SYST BP >= 140 MM HG: CPT | Mod: CPTII,S$GLB,, | Performed by: OBSTETRICS & GYNECOLOGY

## 2023-03-09 PROCEDURE — 1159F MED LIST DOCD IN RCRD: CPT | Mod: CPTII,S$GLB,, | Performed by: OBSTETRICS & GYNECOLOGY

## 2023-03-09 PROCEDURE — 3077F PR MOST RECENT SYSTOLIC BLOOD PRESSURE >= 140 MM HG: ICD-10-PCS | Mod: CPTII,S$GLB,, | Performed by: OBSTETRICS & GYNECOLOGY

## 2023-03-09 PROCEDURE — 1159F PR MEDICATION LIST DOCUMENTED IN MEDICAL RECORD: ICD-10-PCS | Mod: CPTII,S$GLB,, | Performed by: OBSTETRICS & GYNECOLOGY

## 2023-03-09 PROCEDURE — 99214 PR OFFICE/OUTPT VISIT, EST, LEVL IV, 30-39 MIN: ICD-10-PCS | Mod: S$GLB,,, | Performed by: OBSTETRICS & GYNECOLOGY

## 2023-03-09 PROCEDURE — 3079F DIAST BP 80-89 MM HG: CPT | Mod: CPTII,S$GLB,, | Performed by: OBSTETRICS & GYNECOLOGY

## 2023-03-09 PROCEDURE — 99214 OFFICE O/P EST MOD 30 MIN: CPT | Mod: S$GLB,,, | Performed by: OBSTETRICS & GYNECOLOGY

## 2023-03-09 PROCEDURE — 3079F PR MOST RECENT DIASTOLIC BLOOD PRESSURE 80-89 MM HG: ICD-10-PCS | Mod: CPTII,S$GLB,, | Performed by: OBSTETRICS & GYNECOLOGY

## 2023-03-09 RX ORDER — ESTRADIOL 10 UG/1
10 INSERT VAGINAL
Qty: 8 EACH | Refills: 11 | Status: SHIPPED | OUTPATIENT
Start: 2023-03-09 | End: 2023-05-02

## 2023-03-09 RX ORDER — BETAMETHASONE VALERATE 1.2 MG/G
CREAM TOPICAL 2 TIMES DAILY
Qty: 45 G | Refills: 3 | Status: SHIPPED | OUTPATIENT
Start: 2023-03-09 | End: 2023-09-27

## 2023-03-09 NOTE — PROGRESS NOTES
Subjective:       Patient ID: Bonny Luciano is a 77 y.o. female.    Chief Complaint:  Well Woman      History of Present Illness  Pt here for gyn annual.  History and past labs reviewed with patient.    Complaints frequent urination and irritation of the vulva. Having frequent UTIs       Review of Systems  Review of Systems   Constitutional:  Negative for chills and fever.   Respiratory:  Negative for shortness of breath.    Cardiovascular:  Negative for chest pain.   Gastrointestinal:  Negative for abdominal pain, blood in stool, constipation, diarrhea, nausea, vomiting and reflux.   Genitourinary:  Negative for dysmenorrhea, dysuria, hematuria, hot flashes, menstrual problem, pelvic pain, vaginal bleeding, vaginal discharge, postcoital bleeding and vaginal dryness.   Musculoskeletal:  Negative for arthralgias and joint swelling.   Integumentary:  Positive for rash. Negative for hair changes, breast mass, nipple discharge and breast skin changes.   Psychiatric/Behavioral:  Negative for depression. The patient is not nervous/anxious.    Breast: Negative for asymmetry, lump, mass, nipple discharge and skin changes        Objective:     Vitals:    03/09/23 1517   BP: (!) 148/85   Pulse: 82   Weight: 88 kg (194 lb)       Physical Exam:   Constitutional: She appears well-developed and well-nourished. No distress.    HENT:   Head: Normocephalic and atraumatic.    Eyes: Conjunctivae and EOM are normal.    Neck: No tracheal deviation present. No thyromegaly present.    Cardiovascular:       Exam reveals no clubbing, no cyanosis and no edema.        Pulmonary/Chest: Effort normal. No respiratory distress.        Abdominal: Soft. She exhibits no distension and no mass. There is no abdominal tenderness. There is no rebound and no guarding. No hernia.     Genitourinary:    Vagina, uterus and rectum normal.      Pelvic exam was performed with patient supine.   There is rash on the right labia. There is no tenderness,  lesion or injury on the right labia. There is rash on the left labia. There is no tenderness, lesion or injury on the left labia. Cervix is normal. Right adnexum displays no mass, no tenderness and no fullness. Left adnexum displays no mass, no tenderness and no fullness. Vaginal atrophy noted.    Genitourinary Comments: No hyperpigemented areas noted. Vular erythema noted. Skin breakdown superior to the clitoris                   Skin: She is not diaphoretic. No cyanosis. Nails show no clubbing.       breast exam wnl- no nipple dc, skin changes, masses or lymph nodes palpated bilaterally    Assessment:        1. Vulvar irritation    2. Vaginal atrophy                Plan:      Annual   Following at [facility]   Pap no longer indicated   Start imvexxy 10mcg   Betamethasone rx given for irritation   No evidence of recurrence   Flu shot UTD   MMG ordered  RTC  1 year

## 2023-04-06 ENCOUNTER — PATIENT MESSAGE (OUTPATIENT)
Dept: FAMILY MEDICINE | Facility: CLINIC | Age: 78
End: 2023-04-06
Payer: MEDICARE

## 2023-04-06 LAB
AMORPH URATE CRY URNS QL MICRO: NEGATIVE
BACTERIA #/AREA URNS HPF: ABNORMAL /[HPF]
BILIRUB UR QL STRIP: NEGATIVE
CLARITY UR: ABNORMAL
COLOR UR: YELLOW
EPITHELIAL CELLS: ABNORMAL
GLUCOSE (UA): NEGATIVE MG/DL
KETONES UR QL STRIP: NEGATIVE MG/DL
LEUKOCYTE ESTERASE UR QL STRIP: ABNORMAL
MUCOUS THREADS URNS QL MICRO: NEGATIVE
NITRITE UR QL STRIP: NEGATIVE
OCCULT BLOOD: ABNORMAL
PH, URINE: 7 (ref 5–7.5)
PROT UR QL STRIP: NEGATIVE MG/DL
RBC/HPF: ABNORMAL
SP GR UR STRIP: 1 (ref 1–1.03)
UROBILINOGEN, URINE: NORMAL E.U./DL (ref 0–1)
WBC/HPF: ABNORMAL

## 2023-04-14 DIAGNOSIS — N32.81 OAB (OVERACTIVE BLADDER): ICD-10-CM

## 2023-04-14 RX ORDER — OXYBUTYNIN CHLORIDE 10 MG/1
10 TABLET, EXTENDED RELEASE ORAL DAILY
Qty: 90 TABLET | Refills: 3 | Status: SHIPPED | OUTPATIENT
Start: 2023-04-14 | End: 2024-04-13

## 2023-05-02 ENCOUNTER — OFFICE VISIT (OUTPATIENT)
Dept: OBSTETRICS AND GYNECOLOGY | Facility: CLINIC | Age: 78
End: 2023-05-02
Payer: MEDICARE

## 2023-05-02 VITALS
BODY MASS INDEX: 29.04 KG/M2 | WEIGHT: 191 LBS | SYSTOLIC BLOOD PRESSURE: 109 MMHG | DIASTOLIC BLOOD PRESSURE: 62 MMHG | HEART RATE: 87 BPM

## 2023-05-02 DIAGNOSIS — N90.89 VULVAR IRRITATION: Primary | ICD-10-CM

## 2023-05-02 DIAGNOSIS — N95.2 VAGINAL ATROPHY: ICD-10-CM

## 2023-05-02 PROCEDURE — 99213 PR OFFICE/OUTPT VISIT, EST, LEVL III, 20-29 MIN: ICD-10-PCS | Mod: S$GLB,,, | Performed by: OBSTETRICS & GYNECOLOGY

## 2023-05-02 PROCEDURE — 99213 OFFICE O/P EST LOW 20 MIN: CPT | Mod: S$GLB,,, | Performed by: OBSTETRICS & GYNECOLOGY

## 2023-05-02 PROCEDURE — 3078F DIAST BP <80 MM HG: CPT | Mod: CPTII,S$GLB,, | Performed by: OBSTETRICS & GYNECOLOGY

## 2023-05-02 PROCEDURE — 3074F SYST BP LT 130 MM HG: CPT | Mod: CPTII,S$GLB,, | Performed by: OBSTETRICS & GYNECOLOGY

## 2023-05-02 PROCEDURE — 1159F PR MEDICATION LIST DOCUMENTED IN MEDICAL RECORD: ICD-10-PCS | Mod: CPTII,S$GLB,, | Performed by: OBSTETRICS & GYNECOLOGY

## 2023-05-02 PROCEDURE — 3078F PR MOST RECENT DIASTOLIC BLOOD PRESSURE < 80 MM HG: ICD-10-PCS | Mod: CPTII,S$GLB,, | Performed by: OBSTETRICS & GYNECOLOGY

## 2023-05-02 PROCEDURE — 3074F PR MOST RECENT SYSTOLIC BLOOD PRESSURE < 130 MM HG: ICD-10-PCS | Mod: CPTII,S$GLB,, | Performed by: OBSTETRICS & GYNECOLOGY

## 2023-05-02 PROCEDURE — 1159F MED LIST DOCD IN RCRD: CPT | Mod: CPTII,S$GLB,, | Performed by: OBSTETRICS & GYNECOLOGY

## 2023-05-02 NOTE — PROGRESS NOTES
Subjective:       Patient ID: Bonny Luciano is a 77 y.o. female.    Chief Complaint:  Vulvar Rash and Follow-up      History of Present Illness  Pt here for vulvar rash.  History and past labs reviewed with patient.    Complaints frequent urination and irritation of the vulva. Having frequent UTIs. Sx are improved but pt stopped medication for a vulva bx at [facility]. Awaiting results       Review of Systems  Review of Systems   Constitutional:  Negative for chills and fever.   Respiratory:  Negative for shortness of breath.    Cardiovascular:  Negative for chest pain.   Gastrointestinal:  Negative for abdominal pain, blood in stool, constipation, diarrhea, nausea, vomiting and reflux.   Genitourinary:  Negative for dysmenorrhea, dysuria, hematuria, hot flashes, menstrual problem, pelvic pain, vaginal bleeding, vaginal discharge, postcoital bleeding and vaginal dryness.   Musculoskeletal:  Negative for arthralgias and joint swelling.   Integumentary:  Positive for rash. Negative for hair changes, breast mass, nipple discharge and breast skin changes.   Psychiatric/Behavioral:  Negative for depression. The patient is not nervous/anxious.    Breast: Negative for asymmetry, lump, mass, nipple discharge and skin changes        Objective:     Vitals:    05/02/23 1401   BP: 109/62   Pulse: 87   Weight: 86.6 kg (191 lb)       Physical Exam:   Constitutional: She appears well-developed and well-nourished. No distress.    HENT:   Head: Normocephalic and atraumatic.    Eyes: Conjunctivae and EOM are normal.    Neck: No tracheal deviation present. No thyromegaly present.    Cardiovascular:       Exam reveals no clubbing, no cyanosis and no edema.        Pulmonary/Chest: Effort normal. No respiratory distress.        Abdominal: Soft. She exhibits no distension and no mass. There is no abdominal tenderness. There is no rebound and no guarding. No hernia.     Genitourinary:    Vagina, uterus and rectum normal.       Pelvic exam was performed with patient supine.   There is no rash, tenderness, lesion or injury on the right labia. There is no rash, tenderness, lesion or injury on the left labia. Cervix is normal. Right adnexum displays no mass, no tenderness and no fullness. Left adnexum displays no mass, no tenderness and no fullness. Vaginal atrophy noted.    Genitourinary Comments:  Vular erythema improved. Skin breakdown superior to the clitoris resolved                   Skin: She is not diaphoretic. No cyanosis. Nails show no clubbing.           Assessment:        1. Vulvar irritation    2. Vaginal atrophy                  Plan:      Following at [facility]   Betamethasone rx given for irritation   Premarin cream rx sent, imvexxy too expensive

## 2023-06-29 ENCOUNTER — TELEPHONE (OUTPATIENT)
Dept: FAMILY MEDICINE | Facility: CLINIC | Age: 78
End: 2023-06-29
Payer: MEDICARE

## 2023-06-29 NOTE — TELEPHONE ENCOUNTER
----- Message from Nina Coburn MA sent at 6/28/2023  9:06 AM CDT -----  Contact: [facility]()  Apt needed for any HH order and we can not go out ourselves to see pt.          ----- Message -----  From: Maricruz Lutz LPN  Sent: 6/27/2023   3:59 PM CDT  To: Nina Coburn MA      ----- Message -----  From: Sherley Xiao  Sent: 6/27/2023   3:44 PM CDT  To: Milly Jean-Baptiste called to consult with nurse or staff regarding a missed call for the patient. He states a procedure was done at [facility] and states the patient has not been well since and is in a lot of pain. He wanted to see if someone can come out to see her or request home health. He would like a call back regarding this and can be reached at [phone] or [phone]. Thanks/MR         Discharge Summary  Hospital Medicine    Admit Date: 12/20/2017    Date and Time: 12/22/20171:18 PM    Discharge Attending Physician: Cresencio Salazar MD    Primary Care Physician: Greg Johnson MD    Diagnoses:  Active Hospital Problems    Diagnosis  POA    *Gross hematuria [R31.0] s/p TURP  Yes    Urinary retention due to benign prostatic hyperplasia [N40.1, R33.8]  Yes    Urinary retention [R33.9]  Yes    Pulmonary asbestosis [J61]  Yes    HTN (hypertension) [I10]  Yes    BPH (benign prostatic hyperplasia) [N40.0]  Yes        Discharged Condition: Good    Hospital Course:   Patient is a 73 y.o. male admitted to Hospitalist Service from Radiology department admitted directly at directions by Dr. Abdi with complaint of gross hematuria. Patient reportedly has past medical history significant for BPH, history of asbestosis, hypertension and OA. Patient reported, he had been gross hematuria and urine retention problems for the past 3 weeks. Patient is closely following with Dr. Abdi. Patient had been having intermittent grossly hematuria and has had 2 ER visits in last 2-3 days. Patient had cystoscopy performed 2 days ago. Zelaya catheter again placed last night in the ER for retention and gross hematuria. Patient was having frequent supra-pubic discomfort. No associated nausea or vomiting or changes in bowel habits reported. No fever or chills present. Patient denied chest pain, shortness of breath, nausea, vomiting, headache, vision changes, focal neuro-deficits, cough or fever. Patient was admitted to Hospitalist medicine service. Patient was evaluated Dr. Abdi. Patient underwent TURP. Patient tolerated procedure well. Patient to follow up with Dr. Fox in 2 weeks for 2nd stage TURP. Patient is going home with leg-bag. Patient was discharged home in stable condition with following discharge plan of care.     Consults: Dr. Abdi    Significant Diagnostic Studies:   Chest X-Ray: No acute findings. Calcified  pleural plaque formation suggesting prior asbestos exposure.     CT urogram: Significant prostate hypertrophy. There is marked trabeculation and several diverticuli of the bladder with thickening of the bladder wall. The Zelaya balloon has been inflated within the prostate and should be repositioned. Several cysts of each kidney without solid mass, stone or hydronephrosis seen. Diverticulosis coli of the descending and sigmoid colon. Multiple liver cysts. Hiatal hernia and possible esophagitis. Coronary artery calcification.     US bladder: Mild prostate hypertrophy. Trabeculated bladder wall with several bladder diverticuli noted posteriorly. The volume at the present time is 166 cc with internal debris     Renal US: 1. No acute abnormality is seen. Specifically, no evidence for hydronephrosis.  2. Bilateral renal cysts again noted.  3. Increased bilateral renal cortical echogenicity and elevated bilateral resistive indices, consistent with nonspecific, chronic renal parenchymal disease.    Microbiology Results (last 7 days)     ** No results found for the last 168 hours. **        Special Treatments/Procedures: None  Disposition: Home or Self Care    Medications:  Reconciled Home Medications: Current Discharge Medication List      START taking these medications    Details   hydrocodone-acetaminophen 5-325mg (NORCO) 5-325 mg per tablet Take 1 tablet by mouth every 6 (six) hours as needed for Pain.  Qty: 15 tablet, Refills: 0         CONTINUE these medications which have NOT CHANGED    Details   ciprofloxacin HCl (CIPRO) 500 MG tablet Take 1 tablet (500 mg total) by mouth 2 (two) times daily.  Qty: 14 tablet, Refills: 0      FOLIC ACID/MULTIVIT-MIN/LUTEIN (CENTRUM SILVER ORAL) Take by mouth.      glucosamine-chondroitin 500-400 mg tablet Take 1 tablet by mouth 3 (three) times daily.      NIFEdipine (PROCARDIA-XL) 30 MG (OSM) 24 hr tablet Take 1 tablet (30 mg total) by mouth every evening.  Qty: 90 tablet, Refills: 3     Associated Diagnoses: Essential hypertension      tamsulosin (FLOMAX) 0.4 mg Cp24 Take 1 capsule by mouth once daily.  Refills: 0             Discharge Procedure Orders  Diet Adult Regular     Call MD for:   Order Comments: For worsening symptoms, chest pain, shortness of breath, increased abdominal pain, high grade fever, stroke or stroke like symptoms, immediately go to the nearest Emergency Room or call 911 as soon as possible.     Activity as tolerated   Order Comments: Fall precautions       Follow-up Information     Greg Johnson MD In 1 week.    Specialty:  Family Medicine  Contact information:  [address] [facility] [id number] [phone]             Follow up On 12/11/2017.           Please follow up.    Contact information:  At night time use larger urine bag.

## 2023-08-21 ENCOUNTER — TELEPHONE (OUTPATIENT)
Dept: FAMILY MEDICINE | Facility: CLINIC | Age: 78
End: 2023-08-21

## 2023-08-21 DIAGNOSIS — B37.0 THRUSH: Primary | ICD-10-CM

## 2023-08-21 RX ORDER — NYSTATIN 100000 [USP'U]/ML
4 SUSPENSION ORAL 4 TIMES DAILY
Qty: 160 ML | Refills: 0 | Status: SHIPPED | OUTPATIENT
Start: 2023-08-21 | End: 2023-08-31

## 2023-08-21 NOTE — TELEPHONE ENCOUNTER
----- Message from Susana Holguin MA sent at 8/21/2023 11:42 AM CDT -----  Contact: self    ----- Message -----  From: Elin Samano  Sent: 8/21/2023  11:28 AM CDT  To: [facility] Staff    Type:  Same Day Appointment Request    Caller is requesting a same day appointment.  Caller declined first available appointment listed below.    Name of Caller:Bonny Whitten  When is the first available appointment?n/a  Symptoms:medication sent to pharmacy   Best Call Back Number:[phone]   Additional Information: Pt is calling in regards to a medication that melts on her tongue when tongue is white . Pt states she has had it before but doesn't know the name.

## 2023-09-27 ENCOUNTER — OFFICE VISIT (OUTPATIENT)
Dept: FAMILY MEDICINE | Facility: CLINIC | Age: 78
End: 2023-09-27
Payer: MEDICARE

## 2023-09-27 VITALS — WEIGHT: 193 LBS | OXYGEN SATURATION: 98 % | HEART RATE: 71 BPM | BODY MASS INDEX: 29.35 KG/M2

## 2023-09-27 DIAGNOSIS — M51.36 DDD (DEGENERATIVE DISC DISEASE), LUMBAR: ICD-10-CM

## 2023-09-27 DIAGNOSIS — M54.2 CERVICAL MUSCLE PAIN: ICD-10-CM

## 2023-09-27 DIAGNOSIS — L81.9 ATYPICAL PIGMENTED SKIN LESION: Primary | ICD-10-CM

## 2023-09-27 DIAGNOSIS — M50.30 BULGING OF CERVICAL INTERVERTEBRAL DISC: ICD-10-CM

## 2023-09-27 DIAGNOSIS — M43.12 ANTEROLISTHESIS OF CERVICAL SPINE: ICD-10-CM

## 2023-09-27 DIAGNOSIS — H81.10 BENIGN PAROXYSMAL POSITIONAL VERTIGO, UNSPECIFIED LATERALITY: ICD-10-CM

## 2023-09-27 PROBLEM — M48.061 SPINAL STENOSIS OF LUMBAR REGION: Status: RESOLVED | Noted: 2019-03-15 | Resolved: 2023-09-27

## 2023-09-27 PROBLEM — M51.369 DDD (DEGENERATIVE DISC DISEASE), LUMBAR: Status: ACTIVE | Noted: 2023-09-27

## 2023-09-27 PROBLEM — M48.02 SPINAL STENOSIS IN CERVICAL REGION: Status: RESOLVED | Noted: 2019-03-15 | Resolved: 2023-09-27

## 2023-09-27 PROCEDURE — 99214 OFFICE O/P EST MOD 30 MIN: CPT | Mod: S$GLB,,, | Performed by: NURSE PRACTITIONER

## 2023-09-27 PROCEDURE — 99214 PR OFFICE/OUTPT VISIT, EST, LEVL IV, 30-39 MIN: ICD-10-PCS | Mod: S$GLB,,, | Performed by: NURSE PRACTITIONER

## 2023-09-27 PROCEDURE — 1159F PR MEDICATION LIST DOCUMENTED IN MEDICAL RECORD: ICD-10-PCS | Mod: CPTII,S$GLB,, | Performed by: NURSE PRACTITIONER

## 2023-09-27 PROCEDURE — 1159F MED LIST DOCD IN RCRD: CPT | Mod: CPTII,S$GLB,, | Performed by: NURSE PRACTITIONER

## 2023-09-27 RX ORDER — CARVEDILOL 25 MG/1
12.5 TABLET ORAL 2 TIMES DAILY
COMMUNITY

## 2023-09-27 RX ORDER — LOSARTAN POTASSIUM 50 MG/1
50 TABLET ORAL DAILY
COMMUNITY

## 2023-09-27 RX ORDER — ASPIRIN 81 MG/1
81 TABLET ORAL DAILY
COMMUNITY

## 2023-09-27 NOTE — PROGRESS NOTES
"Subjective:      Patient ID: Bonny Luciano is a 77 y.o. female.    Chief Complaint: Neck Pain (lt) and Shoulder Pain (lt)      77-year-old female here today with complaints of on and off left neck/shoulder pain for past 3 months.  She states that she has a tightness/aching sensation about the level of the C7/T1 on the left side.  Worse when she bends her head forward.  She also has associated dizziness when she is bending forward.  Denies orthostatic hypotension.  Denies chest pain, palpitations, shortness of breath, TEE.  Lightheadedness last 20 seconds at best.  Relieved with sitting or standing still.  She tells me that she has neck/shoulder discomfort with standing at the sink and/or bending her head forward.  Denies radiculopathy.  Range of motion is unaffected.  She is currently grabbing at her trapezius during the HPI.  She tells me that sometimes her see 7 prominence is a little swollen.  Denies headache, visual changes, numbness.       She also complaints of a lesion to her left forearm.  Tells me that she picked at it approximately 2 months ago with a "burned needle". Denies pain. Izaiah throbbing. It has not grown. It has a discolored center.     No other issues reported.       Past Medical History:   Diagnosis Date    Anticoagulant long-term use     Asthma     Bladder disorder     Bladder problem     GERD (gastroesophageal reflux disease)     Hyperlipidemia     Hypertension     Malignant melanoma of torso excluding breast 3/26/2019    Melanoma in situ of torso excluding breast 4/17/2019      Social History     Socioeconomic History    Marital status:    Tobacco Use    Smoking status: Never    Smokeless tobacco: Never   Substance and Sexual Activity    Alcohol use: Yes     Comment: X mas, New year, Birthday    Drug use: No    Sexual activity: Yes      Family History   Problem Relation Age of Onset    Diabetes Mother         ROS:   Review of Systems   Constitutional:  Negative for chills and fever. "   HENT:  Negative for ear pain, hearing loss, postnasal drip, sinus pressure and tinnitus.    Eyes:  Negative for photophobia and visual disturbance.   Respiratory:  Negative for apnea, cough, choking, shortness of breath and wheezing.    Cardiovascular:  Negative for chest pain, palpitations and leg swelling.   Gastrointestinal:  Negative for abdominal pain, diarrhea, nausea and vomiting.   Genitourinary:  Negative for difficulty urinating, dysuria, flank pain, frequency, hematuria and urgency.   Musculoskeletal:  Positive for neck pain and neck stiffness. Negative for joint swelling.   Skin:  Negative for rash.   Neurological:  Positive for light-headedness. Negative for dizziness, seizures, syncope, facial asymmetry, weakness, numbness and headaches.   Psychiatric/Behavioral:  Negative for confusion, hallucinations, self-injury and suicidal ideas.    All other systems reviewed and are negative.    Objective:   Physical Exam  Vitals and nursing note reviewed.   Constitutional:       General: She is not in acute distress.     Appearance: She is well-developed. She is not ill-appearing or diaphoretic.   HENT:      Head: Normocephalic and atraumatic.      Right Ear: Tympanic membrane, ear canal and external ear normal.      Left Ear: Tympanic membrane, ear canal and external ear normal.      Nose: Nose normal.      Mouth/Throat:      Mouth: Mucous membranes are moist.   Eyes:      General: Lids are normal. No scleral icterus.     Extraocular Movements: Extraocular movements intact.      Conjunctiva/sclera: Conjunctivae normal.      Pupils: Pupils are equal, round, and reactive to light.   Neck:      Thyroid: Thyromegaly present.   Cardiovascular:      Rate and Rhythm: Normal rate. Rhythm regularly irregular.      Pulses: Normal pulses.      Heart sounds: Normal heart sounds. No murmur heard.     No friction rub. No gallop.   Pulmonary:      Effort: Pulmonary effort is normal.   Abdominal:      General: Bowel sounds  are increased. There is no distension.      Palpations: Abdomen is soft. There is no mass.      Tenderness: There is no abdominal tenderness. There is no right CVA tenderness, left CVA tenderness, guarding or rebound. Negative signs include Hernandez's sign and McBurney's sign.      Hernia: No hernia is present.   Musculoskeletal:      Cervical back: Tenderness present. Muscular tenderness present. No spinous process tenderness. Decreased range of motion.   Lymphadenopathy:      Cervical: No cervical adenopathy.   Skin:     General: Skin is warm and dry.      Capillary Refill: Capillary refill takes less than 2 seconds.      Findings: Lesion present. No erythema or rash.          Neurological:      Mental Status: She is alert and oriented to person, place, and time.   Psychiatric:         Behavior: Behavior normal.         Thought Content: Thought content normal.         Judgment: Judgment normal.       Assessment:     1. Atypical pigmented skin lesion    2. Anterolisthesis of cervical spine    3. Bulging of cervical intervertebral disc    4. DDD (degenerative disc disease), lumbar    5. Benign paroxysmal positional vertigo, unspecified laterality    6. Cervical muscle pain      No images are attached to the encounter.   Plan:     Problem List Items Addressed This Visit          Neuro    Bulging of cervical intervertebral disc    Current Assessment & Plan       MRI dating 11-7-2013 showed mild broad-based disc protrusion or extrusion at C6 through 7 without definite central canal stenosis.        We discussed the pathology and the mechanical issues related to bulging disc.  I offered to investigate her neck pain with a repeat MRI.  She deferred.          Relevant Orders    Ambulatory referral/consult to Physical/Occupational Therapy    DDD (degenerative disc disease), lumbar    Current Assessment & Plan       MRI dating November 7, 2013 showed degenerative joint and disc disease along the lower lumbar spine, left  foraminal disc extrusion at L4 through L5 with some stenosis in the left neural canal.  Prominent disc bulge versus broad-based disc protrusion at L5-S1 with bilateral neural canal narrowing.            Orthopedic    Anterolisthesis of cervical spine     Other Visit Diagnoses       Atypical pigmented skin lesion    -  Primary    Refer to Derm for L forearm lesion. concern for skin cancer.     Relevant Orders    Ambulatory referral/consult to Dermatology    Benign paroxysmal positional vertigo, unspecified laterality        Refer to PT for Epley Maneuver. Ear exam wnl.    Relevant Orders    Ambulatory referral/consult to Physical/Occupational Therapy    Cervical muscle pain        Try Diclofenac topical gel 2-3 times daily.  Refer to PT for dry needling.     Relevant Orders    Ambulatory referral/consult to Physical/Occupational Therapy          Procedures     No visits with results within 1 Month(s) from this visit.   Latest known visit with results is:   Telephone on 03/06/2023   Component Date Value Ref Range Status    Color, UA 04/06/2023 YELLOW   Final    Clarity, UA 04/06/2023 HAZY   Final    Specific Gravity,UA 04/06/2023 1.005  1.005 - 1.030 Final    pH, Urine 04/06/2023 7  5 - 7.5 Final    Leukocytes, UA 04/06/2023 SMALL (A)  NEGATIVE Final    Nitrite, Urine 04/06/2023 NEGATIVE  NEGATIVE Final    Protein, UA 04/06/2023 NEGATIVE  NEGATIVE mg/dL Final    Glucose, UA 04/06/2023 NEGATIVE  NEGATIVE mg/dL Final    Ketones, UA 04/06/2023 NEGATIVE  NEGATIVE mg/dL Final    Urobilinogen, urine 04/06/2023 NORMAL  0 - 1.0 E.U./dL Final    Bilirubin (UA) 04/06/2023 NEGATIVE  NEGATIVE Final    Occult Blood 04/06/2023 MODERATE (A)  NEGATIVE Final    WBC/HPF 04/06/2023 0-5  <5 Final    RBC/HPF 04/06/2023 5-10 (A)  <5 Final    Amorphous, UA 04/06/2023 NEGATIVE   Final    Bacteria, UA 04/06/2023 TRACE  NEG-TRACE Final    Epithelial Cells 04/06/2023 FEW  NEGATIVE-FEW Final    Mucus, UA 04/06/2023 NEGATIVE  NEGATIVE Final     Comment: NOTE  Testing performed at:  [facility], [address] [place]  [address] CLIA #:[id number]          No results found in the last 30 days.       Duration of encounter:  minutes  This includes face-to-face time and non face-to-face time preparing to see the patient (eg, review of tests), obtaining and/or reviewing separately obtained history, documenting clinical information in the electronic or other health record, independently interpreting resultsand communicating results to the patient/family/caregiver, or care coordination    All diagnostic data (labs/imaging) was reviewed with the patient and/or family member in the room.  All questions were answered to their liking. The patient and/or family member voiced understanding of all instructions provided. Expectations regarding follow up and treatment plan were voiced and confirmed prior to departure. The patient was given orders/instructions at the end of the visit for reference. They were instructed to notify my office if they have not been contacted for imaging/referrals/labs/results in 1-2 weeks. They voiced understanding of all of the above.     Follow up:     There are no Patient Instructions on file for this visit.     Follow up in about 6 months (around 3/27/2024), or if symptoms worsen or fail to improve.

## 2023-09-27 NOTE — ASSESSMENT & PLAN NOTE
MRI dating November 7, 2013 showed degenerative joint and disc disease along the lower lumbar spine, left foraminal disc extrusion at L4 through L5 with some stenosis in the left neural canal.  Prominent disc bulge versus broad-based disc protrusion at L5-S1 with bilateral neural canal narrowing.

## 2023-09-27 NOTE — ASSESSMENT & PLAN NOTE
MRI dating 11-7-2013 showed mild broad-based disc protrusion or extrusion at C6 through 7 without definite central canal stenosis.        We discussed the pathology and the mechanical issues related to bulging disc.  I offered to investigate her neck pain with a repeat MRI.  She deferred.

## 2023-10-17 ENCOUNTER — TELEPHONE (OUTPATIENT)
Dept: FAMILY MEDICINE | Facility: CLINIC | Age: 78
End: 2023-10-17

## 2023-10-17 DIAGNOSIS — H93.8X9 SENSATION OF FULLNESS IN EAR, UNSPECIFIED LATERALITY: Primary | ICD-10-CM

## 2023-10-17 NOTE — TELEPHONE ENCOUNTER
----- Message from Ramona Navarrete MA sent at 10/17/2023  3:14 PM CDT -----  Contact: self    ----- Message -----  From: Jacqueline Jackson  Sent: 10/17/2023   2:04 PM CDT  To: [facility]ly Cast Staff    Pt is requesting a referral to ENT in [facility] for ear issues [facility] call [phone]

## 2024-01-09 ENCOUNTER — OFFICE VISIT (OUTPATIENT)
Dept: FAMILY MEDICINE | Facility: CLINIC | Age: 79
End: 2024-01-09
Payer: MEDICARE

## 2024-01-09 VITALS — WEIGHT: 202 LBS | SYSTOLIC BLOOD PRESSURE: 100 MMHG | DIASTOLIC BLOOD PRESSURE: 70 MMHG | BODY MASS INDEX: 30.71 KG/M2

## 2024-01-09 DIAGNOSIS — Z09 HOSPITAL DISCHARGE FOLLOW-UP: ICD-10-CM

## 2024-01-09 DIAGNOSIS — K57.31 DIVERTICULOSIS OF COLON WITH HEMORRHAGE: Primary | ICD-10-CM

## 2024-01-09 PROCEDURE — 99213 OFFICE O/P EST LOW 20 MIN: CPT | Mod: S$GLB,,, | Performed by: NURSE PRACTITIONER

## 2024-01-09 PROCEDURE — 3074F SYST BP LT 130 MM HG: CPT | Mod: CPTII,S$GLB,, | Performed by: NURSE PRACTITIONER

## 2024-01-09 PROCEDURE — 1159F MED LIST DOCD IN RCRD: CPT | Mod: CPTII,S$GLB,, | Performed by: NURSE PRACTITIONER

## 2024-01-09 PROCEDURE — 3078F DIAST BP <80 MM HG: CPT | Mod: CPTII,S$GLB,, | Performed by: NURSE PRACTITIONER

## 2024-01-09 NOTE — PROGRESS NOTES
Subjective:      Patient ID: Bonny Luciano is a 78 y.o. female.    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        Patient is here today for hospital follow-up.  Her PCP is [facility].  She is seen here on an as-needed basis.  Recently admitted for GI bleed.  Had colonoscopy and EGD which showed diverticulosis with bleed.  This has resolved.       No other issues reported.             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   Respiratory:  Negative for apnea, cough, choking, shortness of breath and wheezing.    Cardiovascular:  Negative for chest pain, palpitations and leg swelling.   Gastrointestinal:  Negative for abdominal pain, blood in stool, diarrhea, nausea, rectal pain and vomiting.   Endocrine: Negative for polydipsia, polyphagia and polyuria.   Genitourinary:  Negative for difficulty urinating, dysuria, flank pain, frequency, hematuria and urgency.   Musculoskeletal:  Negative for joint swelling and neck stiffness.   Skin:  Negative for rash.    Neurological:  Negative for weakness.   Psychiatric/Behavioral:  Negative for confusion, hallucinations, self-injury and suicidal ideas.    All other systems reviewed and are negative.    Objective:   Physical Exam  Vitals and nursing note reviewed.   Constitutional:       General: She is not in acute distress.     Appearance: Normal appearance. She is well-developed. She is not ill-appearing or diaphoretic.   HENT:      Head: Normocephalic and atraumatic.   Eyes:      General: No scleral icterus.     Pupils: Pupils are equal, round, and reactive to light.   Neck:      Thyroid: Thyromegaly present.   Cardiovascular:      Rate and Rhythm: Normal rate. Rhythm regularly irregular.      Pulses: Normal pulses.      Heart sounds: Normal heart sounds. No murmur heard.     No friction rub. No gallop.   Pulmonary:      Effort: Pulmonary effort is normal.   Abdominal:      General: Abdomen is flat. Bowel sounds are increased. There is no distension.      Palpations: Abdomen is soft. There is no mass.      Tenderness: There is no abdominal tenderness.   Musculoskeletal:         General: Normal range of motion.   Skin:     General: Skin is warm and dry.      Capillary Refill: Capillary refill takes less than 2 seconds.      Findings: No erythema or rash.          Neurological:      Mental Status: She is alert and oriented to person, place, and time.   Psychiatric:         Behavior: Behavior normal.         Thought Content: Thought content normal.         Judgment: Judgment normal.       Assessment:     1. Diverticulosis of colon with hemorrhage    2. Hospital discharge follow-up      No images are attached to the encounter.   Plan:     Problem List Items Addressed This Visit          GI    Diverticulosis of colon with hemorrhage - Primary     Other Visit Diagnoses       Hospital discharge follow-up              Procedures     No visits with results within 1 Month(s) from this visit.   Latest known visit with results is:    Telephone on 03/06/2023   Component Date Value Ref Range Status    Color, UA 04/06/2023 YELLOW   Final    Clarity, UA 04/06/2023 HAZY   Final    Specific Gravity,UA 04/06/2023 1.005  1.005 - 1.030 Final    pH, Urine 04/06/2023 7  5 - 7.5 Final    Leukocytes, UA 04/06/2023 SMALL (A)  NEGATIVE Final    Nitrite, Urine 04/06/2023 NEGATIVE  NEGATIVE Final    Protein, UA 04/06/2023 NEGATIVE  NEGATIVE mg/dL Final    Glucose, UA 04/06/2023 NEGATIVE  NEGATIVE mg/dL Final    Ketones, UA 04/06/2023 NEGATIVE  NEGATIVE mg/dL Final    Urobilinogen, urine 04/06/2023 NORMAL  0 - 1.0 E.U./dL Final    Bilirubin (UA) 04/06/2023 NEGATIVE  NEGATIVE Final    Occult Blood 04/06/2023 MODERATE (A)  NEGATIVE Final    WBC/HPF 04/06/2023 0-5  <5 Final    RBC/HPF 04/06/2023 5-10 (A)  <5 Final    Amorphous, UA 04/06/2023 NEGATIVE   Final    Bacteria, UA 04/06/2023 TRACE  NEG-TRACE Final    Epithelial Cells 04/06/2023 FEW  NEGATIVE-FEW Final    Mucus, UA 04/06/2023 NEGATIVE  NEGATIVE Final    Comment: NOTE  Testing performed at:  [facility], [address] [place] [address] CLIA #:[id number]          No results found in the last 30 days.       Duration of encounter:  minutes  This includes face-to-face time and non face-to-face time preparing to see the patient (eg, review of tests), obtaining and/or reviewing separately obtained history, documenting clinical information in the electronic or other health record, independently interpreting resultsand communicating results to the patient/family/caregiver, or care coordination      DISCLAIMER: This note was prepared with [employer] voice recognition transcription software. Garbled syntax, mangled pronouns, and other bizarre constructions may be attributed to that software system.     All diagnostic data (labs/imaging) was reviewed with the patient and/or family member in the room.  All questions were answered to their liking. The patient and/or family member voiced understanding of  all instructions provided. Expectations regarding follow up and treatment plan were voiced and confirmed prior to departure. The patient was given orders/instructions at the end of the visit for reference. They were instructed to notify my office if they have not been contacted for imaging/referrals/labs/results in 1-2 weeks. They voiced understanding of all of the above.     Follow up:     There are no Patient Instructions on file for this visit.     Follow up if symptoms worsen or fail to improve, for [facility]> .

## 2024-04-15 PROBLEM — K57.31 DIVERTICULOSIS OF COLON WITH HEMORRHAGE: Status: RESOLVED | Noted: 2024-01-09 | Resolved: 2024-04-15

## 2024-10-29 ENCOUNTER — TELEPHONE (OUTPATIENT)
Dept: OBSTETRICS AND GYNECOLOGY | Facility: CLINIC | Age: 79
End: 2024-10-29
Payer: MEDICARE

## 2024-12-17 ENCOUNTER — OFFICE VISIT (OUTPATIENT)
Dept: FAMILY MEDICINE | Facility: CLINIC | Age: 79
End: 2024-12-17
Payer: MEDICARE

## 2024-12-17 VITALS
WEIGHT: 192 LBS | HEART RATE: 82 BPM | OXYGEN SATURATION: 96 % | BODY MASS INDEX: 29.19 KG/M2 | DIASTOLIC BLOOD PRESSURE: 65 MMHG | SYSTOLIC BLOOD PRESSURE: 98 MMHG

## 2024-12-17 DIAGNOSIS — Z98.890 HISTORY OF LEFT ATRIAL APPENDAGE CLOSURE: ICD-10-CM

## 2024-12-17 DIAGNOSIS — E27.40 ADRENAL CORTICAL HYPOFUNCTION: ICD-10-CM

## 2024-12-17 DIAGNOSIS — R29.898 WEAKNESS OF BOTH LEGS: ICD-10-CM

## 2024-12-17 DIAGNOSIS — I27.20 PULMONARY HYPERTENSION: ICD-10-CM

## 2024-12-17 DIAGNOSIS — I48.20 CHRONIC ATRIAL FIBRILLATION: ICD-10-CM

## 2024-12-17 DIAGNOSIS — R32 URINARY INCONTINENCE, UNSPECIFIED TYPE: ICD-10-CM

## 2024-12-17 DIAGNOSIS — E11.22 TYPE 2 DIABETES MELLITUS WITH CHRONIC KIDNEY DISEASE, WITHOUT LONG-TERM CURRENT USE OF INSULIN, UNSPECIFIED CKD STAGE: ICD-10-CM

## 2024-12-17 DIAGNOSIS — R26.81 UNSTEADY GAIT: ICD-10-CM

## 2024-12-17 DIAGNOSIS — E44.1 MILD PROTEIN-CALORIE MALNUTRITION: ICD-10-CM

## 2024-12-17 DIAGNOSIS — C79.51 SECONDARY MALIGNANT NEOPLASM OF BONE: ICD-10-CM

## 2024-12-17 DIAGNOSIS — R30.0 DYSURIA: Primary | ICD-10-CM

## 2024-12-17 DIAGNOSIS — N18.31 CHRONIC KIDNEY DISEASE, STAGE 3A: ICD-10-CM

## 2024-12-17 DIAGNOSIS — C51.9 MALIGNANT MELANOMA OF VULVA: ICD-10-CM

## 2024-12-17 DIAGNOSIS — J96.10 CHRONIC RESPIRATORY FAILURE, UNSPECIFIED WHETHER WITH HYPOXIA OR HYPERCAPNIA: ICD-10-CM

## 2024-12-17 DIAGNOSIS — D68.8 OTHER SPECIFIED COAGULATION DEFECTS: ICD-10-CM

## 2024-12-17 PROCEDURE — 3078F DIAST BP <80 MM HG: CPT | Mod: CPTII,S$GLB,, | Performed by: NURSE PRACTITIONER

## 2024-12-17 PROCEDURE — 3074F SYST BP LT 130 MM HG: CPT | Mod: CPTII,S$GLB,, | Performed by: NURSE PRACTITIONER

## 2024-12-17 PROCEDURE — 99214 OFFICE O/P EST MOD 30 MIN: CPT | Mod: S$GLB,,, | Performed by: NURSE PRACTITIONER

## 2024-12-17 PROCEDURE — 1159F MED LIST DOCD IN RCRD: CPT | Mod: CPTII,S$GLB,, | Performed by: NURSE PRACTITIONER

## 2024-12-17 RX ORDER — OXYBUTYNIN CHLORIDE 15 MG/1
15 TABLET, EXTENDED RELEASE ORAL DAILY
Qty: 30 TABLET | Refills: 11 | Status: SHIPPED | OUTPATIENT
Start: 2024-12-17 | End: 2025-12-17

## 2024-12-17 NOTE — PROGRESS NOTES
Subjective:      Patient ID: Bonny Luciano is a 79 y.o. female.    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      AFib  H/o MVR  Chronic hypoxic respiratory failure (2 liters)   T2DM  Hypothyroidism  Vit D deficiency    PSH:  MVR  Hysterectomy  Melanoma excision from vulva    SH:  No tobacco, EtOH, illicit substances       Bonny Lyle is a 78 y.o. female with mucosal melanoma (vulvovaginal, bladder, urethra) PmHX includes adrenal insufficieny.     She was initially diagnosed with melanoma in situ of the vulva in 5/2019 and again 7/2019. It was able to be seen on PETCT 7/2019. She received 4 induction cycles of ipilimumab + nivolumab combination followed by single agent nivolumab started 8/12/2019-4/2021. She was also applying Aldara during 7/2020-3/2021. She underwent partial vulvectomy 5/14/21. She continued with applying Aldara cream every 3 days at least until 3/2023. 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 [place] [facility] [place] dispositioned to [facility]. She received [facility] x15 into different areas including the urethra, vagina, clitoral yoon 5/2023-12/2023. Lesions responded but new clitoral mass 12/2023 showed bx proven melanoma. She had a PET/CT and MRI pelvis that showed new foci of uptake in the vulva and vagina. Bx of vulvovaginal areas 2/2024 confirmed melanomas. She started ipilimumab + nivolumab 3/21/2024.       Patient was seen in clinic on 5/9/2024 where her labs are concerning for CHECO and possible cardiac issues possibly immune mediated toxicity. Patient had fatigue, shortness of breath, hypotension. Patient baseline is  shortness of breath requiring wheelchair for several months.  She currently has home health.     Echo 5/9/2024 EF 55 to 60%. Troponin T elevated at 104. Troponin I sent out 5/10/2024 was 94. No signs of ICI induced myocarditis. EKG demonstrating A-fib. Cardiac workup negative.       Patient is here today for hospital follow-up.  Her PCP is [facility].  She is seen here on an as-needed basis.  Recently admitted for unsteady gait.  Currently ambulatory w/ wheelchair and walker. Only complaint today is dysuria.  She is requesting increase in oxybutinin stating she has urinary incontinence. Denies bowel changes. Denies fever, chills, flank pain, hematuria.     No other issues reported.       Past Medical History:   Diagnosis Date    Anticoagulant long-term use     Asthma     Bladder disorder     Bladder problem     CHF (congestive heart failure)     GERD (gastroesophageal reflux disease)     Hyperlipidemia     Hypertension     Malignant melanoma of torso excluding breast 03/26/2019    Melanoma in situ of torso excluding breast 04/17/2019    Muscle atrophy     Paroxysmal atrial fibrillation     Unsteady gait       Social History     Socioeconomic History    Marital status:    Tobacco Use    Smoking status: Never    Smokeless tobacco: Never   Substance and Sexual Activity    Alcohol use: Yes     Comment: X mas, New year, Birthday    Drug use: No    Sexual activity: Yes      Family History   Problem Relation Name Age of Onset    Diabetes Mother          ROS:   Review of Systems   Constitutional:  Negative for appetite change, chills and fever.   Respiratory:  Negative for chest tightness and shortness of breath.    Cardiovascular:  Negative for chest pain and palpitations.   Gastrointestinal:  Negative for abdominal pain, nausea and vomiting.   Genitourinary:  Positive for dysuria and frequency. Negative for difficulty urinating, flank pain and hematuria.   Musculoskeletal:  Negative for back pain and  neck pain.   Skin:  Negative for rash.   Neurological:  Negative for dizziness, seizures, syncope, weakness, light-headedness and headaches.   Psychiatric/Behavioral:  Negative for agitation, confusion, decreased concentration, dysphoric mood, hallucinations, sleep disturbance and suicidal ideas. The patient is not nervous/anxious.      Objective:   Physical Exam  Vitals and nursing note reviewed.   Constitutional:       General: She is awake. She is not in acute distress.     Appearance: Normal appearance. She is overweight. She is not ill-appearing, toxic-appearing or diaphoretic.       Cardiovascular:      Rate and Rhythm: Normal rate.   Pulmonary:      Effort: Pulmonary effort is normal.   Abdominal:      General: Abdomen is flat. Bowel sounds are normal.      Tenderness: There is no right CVA tenderness or left CVA tenderness.   Musculoskeletal:         General: Normal range of motion.      Cervical back: Normal range of motion.   Skin:     General: Skin is warm.   Neurological:      Mental Status: She is alert and oriented to person, place, and time. Mental status is at baseline.   Psychiatric:         Mood and Affect: Mood normal.         Behavior: Behavior normal. Behavior is cooperative.         Thought Content: Thought content normal.         Judgment: Judgment normal.       Assessment:     1. Dysuria    2. Malignant melanoma of vulva    3. Chronic kidney disease, stage 3a    4. Mild protein-calorie malnutrition    5. Adrenal cortical hypofunction    6. Other specified coagulation defects    7. Pulmonary hypertension    8. Unsteady gait    9. Type 2 diabetes mellitus with chronic kidney disease, without long-term current use of insulin, unspecified CKD stage    10. Chronic atrial fibrillation    11. History of left atrial appendage closure    12. Weakness of both legs    13. Urinary incontinence, unspecified type    14. Secondary malignant neoplasm of bone    15. Chronic respiratory failure, unspecified  whether with hypoxia or hypercapnia      No images are attached to the encounter.   Plan:     Problem List Items Addressed This Visit          Pulmonary    Chronic respiratory failure, unspecified whether with hypoxia or hypercapnia       Cardiac/Vascular    Atrial fibrillation    Overview     PT unsure of Cardiologist.          Current Assessment & Plan     Rate controlled on Metoprolol and Cardizem.  She is normally managed by [facility]            Pulmonary hypertension    Overview     See Echo report         Current Assessment & Plan     Followed by cardio. Stable. Will continue to monitor.  Continue current meds and f/u in 6 months. RTC sooner if symptoms persist.           History of left atrial appendage closure    Current Assessment & Plan     No longer on eliquis.             Renal/    Chronic kidney disease, stage 3a    Current Assessment & Plan     Stable. [facility] Will continue to monitor.  Continue current meds and f/u in 6 months. RTC sooner if symptoms persist.              Hematology    Other specified coagulation defects    Current Assessment & Plan     Unsure of this diagnosis as she is no longer anticoagulated.             Oncology    Malignant melanoma of vulva    Relevant Orders    Ambulatory referral/consult to Home Health    Secondary malignant neoplasm of bone       Endocrine    Mild protein-calorie malnutrition    Adrenal cortical hypofunction    Type 2 diabetes mellitus with chronic kidney disease, without long-term current use of insulin, unspecified CKD stage    Relevant Orders    Ambulatory referral/consult to Home Health       Other    Unsteady gait    Current Assessment & Plan     Refer to Home health for PT/OT.           Other Visit Diagnoses       Dysuria    -  Primary    Relevant Medications    oxybutynin (DITROPAN XL) 15 MG TR24    Other Relevant Orders    Urinalysis, Reflex to Urine Culture Urine, Clean Catch (Completed)    Ambulatory referral/consult to Urology    Weakness of  both legs        Relevant Orders    Ambulatory referral/consult to [facility]    Urinary incontinence, unspecified type        Relevant Medications    oxybutynin (DITROPAN XL) 15 MG TR24    Other Relevant Orders    Urinalysis, Reflex to Urine Culture Urine, Clean Catch (Completed)    Ambulatory referral/consult to Urology          Procedures     Office Visit on 12/17/2024   Component Date Value Ref Range Status    Color, UA 12/17/2024 YELLOW   Final    Clarity, UA 12/17/2024 CLEAR   Final    Specific Gravity,UA 12/17/2024 1.010  1.005 - 1.030 Final    pH, Urine 12/17/2024 5  5 - 7.5 Final    Leukocytes, UA 12/17/2024 NEGATIVE  NEGATIVE Final    Nitrite, Urine 12/17/2024 POSITIVE (A)  NEGATIVE Final    Protein, UA 12/17/2024 NEGATIVE  NEGATIVE mg/dL Final    Glucose, UA 12/17/2024 1,000 (H)  NEGATIVE mg/dL Final    Ketones, UA 12/17/2024 NEGATIVE  NEGATIVE mg/dL Final    Urobilinogen, urine 12/17/2024 NORMAL  0 - 1.0 E.U./dL Final    Bilirubin (UA) 12/17/2024 NEGATIVE  NEGATIVE Final    Occult Blood 12/17/2024 NEGATIVE  NEGATIVE Final    WBC/HPF 12/17/2024 10-15 (A)  <5 Final    RBC/HPF 12/17/2024 RARE  <5 Final    Amorphous, UA 12/17/2024 NEGATIVE   Final    Bacteria, UA 12/17/2024 4+ (A)  NEG-TRACE Final    Epithelial Cells 12/17/2024 FEW  NEGATIVE-FEW Final    Mucus, UA 12/17/2024 NEGATIVE  NEGATIVE Final    Hyaline Casts, UA 12/17/2024 0-2 PER LPF   Final    Comment: NOTE  Testing performed at:  [facility], [address] [place] [address]  [id number] CLIA #:[id number]      Urine Culture, Routine 12/17/2024    Final    Comment: Source: URINE  Site:  Organism #1                    ESCHERICHIA COLI  Quantity                      >100,000    Antibiotic Susceptibility  Organism #                1  Isolate                   E. Coli  Antibiotic                INT   SRINATH  Amikacin                  S     <=16  Amoxicillin/K Clavulanate S     <=8/4  Ampicillin                S     <=8  Ampicillin/sulbactam      S      <=4/2  Aztreonam                 S     <=4  Cefazolin                 S     <=2  Cefepime                  S     <=2  Cefotaxime                S     <=2  Cefoxitin                 S     <=8  Ceftazidime               S     <=1  Ceftazidime/Avibactam     S     <=4  Ceftolozane/Tazobactam    S     <=2  Ceftriaxone               S     <=1  Cefuroxime                S     <=4  Ciprofloxacin             S     <=0.25  Ertapenem                 S     <=0.5  Gentamycin                S     <=2  Imipenem                  S     <=1  Levofloxacin              S     <=0.5  Meropenem/Vaborbactam     S     <=2  Nitrofu                           rantoin            S     <=32  Piperacillin/tazobactam   S     <=8  Tetracycline              S     <=4  Tobramycin                S     <=2  Trimethoprim/sulfa        S     <=0.5/9.5    S=Susceptible   I=Intermediate    R=Resistant    INT=Interpretation   SRINATH=Minimum Inhibitory Concentration  NOTE  Testing performed at:  [facility], [address] [place] [address] CLIA #:[id number]          MRI Pelvis W WO Contrast    Result Date: 11/27/2024  FULL RESULT:    Examination: MRI PELVIS W WO CONTRAST, 11/26/2024 7:56 PM.    Clinical History: 79-year-old female with malignant melanoma of vulva status post immunotherapy, partial vulvectomy, and radiation therapy completed in July 2024    Indication: Cancer staging or restaging    Comparison: PET/CT dated 11/26/2024, MRI pelvis dated 05/22/2024    Technique: Multiplanar, multisequence magnetic resonance imaging of the pelvis was performed without and with intravenous administration of contrast.    Findings:    GI Tract: Uninflamed colonic diverticula.     Urinary Bladder: No suspicious mass or lesion is noted. No bladder wall thickening.    Reproductive Organs: Status post hysterectomy. From prior exam on 05/22/2024, interval decrease size of left vaginal cuff implant measuring 2.8 x 1.9 cm (axial 6, image 35), previously  measuring 6.0 x 4.2 cm on 5/22/2024. This lesion no longer abuts the bladder or rectal wall posteriorly.    Interval decrease size of vulvar lesion, 0.7 cm (postcontrast dynamic series 16, image 1311), was 1.5 cm using same measurement technique on 5/22/2024.    Peritoneum/Retroperitoneum: Interval development of 1.8 x 1.2 cm T2 hyperintense, enhancing lesion seen superior and anterior to the bladder, corresponding to FDG avid peritoneal implant seen on recent PET/CT (series 5, image 35).    Lymph Nodes: No suspicious lymphadenopathy is noted.    Vessels: Patent.    Musculoskeletal: Enhancing/diffusion restricting foci in the right anterior humeral head, sacrum, left ilium, and proximal left humeral diaphysis, corresponding to known osseous metastases seen on recent PET/CT, new since 5/22/2024.    Other: 2.4 cm lesion along the inferior hepatic border corresponds to hepatic metastasis is seen on recent PET/CT (series 4, image 26), also new from 5/22/2024.      PET CT INITIAL TREATMENT STRATEGY ([facility] ONLY)    Result Date: 11/26/2024  FULL RESULT:    Examination: 18F-FDG-PET/CT without contrast, 11/26/2024 4:08 PM    Clinical History: Vulvar melanoma    Indication: Restaging study for subsequent treatment strategy.    Comparison: PET/CT on 05/22/2024    Technique:    F-18 fluorodeoxyglucose 6.8 mCi was administered intravenously via right antecubital fossa. To allow for distribution and uptake of radiotracer, the patient was asked to rest quietly for approximately 60-90 minutes. PET/CT imaging was performed from the vertex to toes. CT scanning was done for attenuation correction, image registration, and diagnosis with scan parameters optimized to minimize radiation exposure to the patient. SUV measurements are reported as maximum SUV based on body weight unless otherwise specified.    Findings:   Head and Neck:  No abnormal FDG uptake within head and neck. Clear paranasal sinuses. No FDG avid cervical  lymphadenopathy.    Chest:   Multiple new bilateral hypermetabolic pulmonary metastases for example left upper lobe nodule (image 126) measures 1.8 cm with SUV max of 8.3. Right upper lobe pulmonary metastases (image 135) measures 1.7 cm with SUV max of 13. Right middle lobe metastases (image 173) measures 1.3 cm with SUV max of 12.7. No pleural effusion or consolidation.    Moderate cardiomegaly. No pericardial effusion.     No FDG avid thoracic lymphadenopathy.    Abdomen and Pelvis:   Hypermetabolic hepatic metastases for example segment 4A metastases (image 192) measuring 2.1 cm with SUV max of 7.8. Segment 6 metastases (image 220) measuring 2.8 cm with SUV max of 12.4.    No focal abnormal FDG uptake within the spleen, pancreas or adrenal mass. No splenomegaly. Physiologic uptake within gastrointestinal and genitourinary tract. No hydronephrosis. Diverticulosis of the colon, particularly sigmoid colon without diverticulitis.    No FDG avid abdominal, pelvic or inguinal lymphadenopathy.    Decreased left vaginal cuff hypermetabolic lesion (image 314) measuring 2.5 cm compared to 5.9 cm, with SUV max of 8.1 compared to 16.4 previously. Hypermetabolic focus on the left vulva (image 347) due to contamination.    New anterior pelvic hypermetabolic implant (image 309) measuring 1.9 cm with SUV max of 7.4.    Musculoskeletal:   Multiple FDG avid osseous metastases for example right iliac lesion (image 262) with SUV max of 6.2. Left coccygeal lesion (image 293) with SUV max of 6.2. Left acetabular lesion (image 296) with SUV max of 6.9.           Duration of encounter:  minutes  This includes face-to-face time and non face-to-face time preparing to see the patient (eg, review of tests), obtaining and/or reviewing separately obtained history, documenting clinical information in the electronic or other health record, independently interpreting resultsand communicating results to the patient/family/caregiver, or care  coordination      DISCLAIMER: This note was prepared with [employer] voice recognition transcription software. Garbled syntax, mangled pronouns, and other bizarre constructions may be attributed to that software system.     All diagnostic data (labs/imaging) was reviewed with the patient and/or family member in the room. All preventative measures (vaccinations, screenings, testing, imaging) were discussed in depth and offered to the patient in accordance with current medical guidelines to ensure the patient is up to date.  All questions were answered to their liking. The patient and/or family member voiced understanding of all instructions provided. Expectations regarding follow up and treatment plan were voiced and confirmed prior to departure. The patient was given orders/instructions at the end of the visit for reference. They were instructed to notify my office if they have not been contacted for imaging/referrals/labs/results in 1-2 weeks. They voiced understanding of all of the above.     Follow up:     There are no Patient Instructions on file for this visit.     Follow up in about 6 months (around 6/17/2025), or if symptoms worsen or fail to improve.

## 2024-12-18 DIAGNOSIS — N39.0 URINARY TRACT INFECTION WITHOUT HEMATURIA, SITE UNSPECIFIED: Primary | ICD-10-CM

## 2024-12-18 RX ORDER — CIPROFLOXACIN 500 MG/1
500 TABLET ORAL 2 TIMES DAILY
Qty: 14 TABLET | Refills: 0 | Status: SHIPPED | OUTPATIENT
Start: 2024-12-18 | End: 2024-12-25

## 2024-12-19 LAB
AMORPH URATE CRY URNS QL MICRO: NEGATIVE
BACTERIA #/AREA URNS HPF: ABNORMAL /[HPF]
BILIRUB UR QL STRIP: NEGATIVE
CLARITY UR: CLEAR
COLOR UR: YELLOW
EPITHELIAL CELLS: ABNORMAL
GLUCOSE (UA): 1000 MG/DL
HYALINE CASTS #/AREA URNS LPF: ABNORMAL /[LPF]
KETONES UR QL STRIP: NEGATIVE MG/DL
LEUKOCYTE ESTERASE UR QL STRIP: NEGATIVE
MUCOUS THREADS URNS QL MICRO: NEGATIVE
NITRITE UR QL STRIP: POSITIVE
OCCULT BLOOD: NEGATIVE
PH, URINE: 5 (ref 5–7.5)
PROT UR QL STRIP: NEGATIVE MG/DL
RBC/HPF: ABNORMAL
SP GR UR STRIP: 1.01 (ref 1–1.03)
URINE CULTURE, ROUTINE: NORMAL
UROBILINOGEN, URINE: NORMAL E.U./DL (ref 0–1)
WBC/HPF: ABNORMAL

## 2024-12-20 ENCOUNTER — TELEPHONE (OUTPATIENT)
Dept: UROLOGY | Facility: CLINIC | Age: 79
End: 2024-12-20
Payer: MEDICARE

## 2024-12-20 PROBLEM — J96.10 CHRONIC RESPIRATORY FAILURE, UNSPECIFIED WHETHER WITH HYPOXIA OR HYPERCAPNIA: Status: ACTIVE | Noted: 2024-12-20

## 2024-12-20 PROBLEM — C79.51 SECONDARY MALIGNANT NEOPLASM OF BONE: Status: ACTIVE | Noted: 2024-12-20

## 2024-12-20 NOTE — ASSESSMENT & PLAN NOTE
Followed by cardio. Stable. Will continue to monitor.  Continue current meds and f/u in 6 months. RTC sooner if symptoms persist.

## 2024-12-20 NOTE — TELEPHONE ENCOUNTER
----- Message from Massiel sent at 12/20/2024  8:31 AM CST -----    ----- Message -----  From: Sherie Gunter NP  Sent: 12/17/2024   1:54 PM CST  To: Massiel Martinez      ----- Message -----  From: Nina Coburn MA  Sent: 12/17/2024  10:49 AM CST  To: Yeison Aguilar

## 2024-12-20 NOTE — ASSESSMENT & PLAN NOTE
Stable. [facility] Will continue to monitor.  Continue current meds and f/u in 6 months. RTC sooner if symptoms persist.

## 2025-01-30 ENCOUNTER — OFFICE VISIT (OUTPATIENT)
Dept: FAMILY MEDICINE | Facility: CLINIC | Age: 80
End: 2025-01-30
Payer: MEDICARE

## 2025-01-30 VITALS — OXYGEN SATURATION: 96 % | HEART RATE: 64 BPM | SYSTOLIC BLOOD PRESSURE: 124 MMHG | DIASTOLIC BLOOD PRESSURE: 68 MMHG

## 2025-01-30 DIAGNOSIS — L50.9 URTICARIA: Primary | ICD-10-CM

## 2025-01-30 PROCEDURE — 1159F MED LIST DOCD IN RCRD: CPT | Mod: CPTII,S$GLB,, | Performed by: NURSE PRACTITIONER

## 2025-01-30 PROCEDURE — 3074F SYST BP LT 130 MM HG: CPT | Mod: CPTII,S$GLB,, | Performed by: NURSE PRACTITIONER

## 2025-01-30 PROCEDURE — 99212 OFFICE O/P EST SF 10 MIN: CPT | Mod: S$GLB,,, | Performed by: NURSE PRACTITIONER

## 2025-01-30 PROCEDURE — 3078F DIAST BP <80 MM HG: CPT | Mod: CPTII,S$GLB,, | Performed by: NURSE PRACTITIONER

## 2025-01-30 RX ORDER — FAMOTIDINE 20 MG/1
20 TABLET, FILM COATED ORAL 2 TIMES DAILY
Qty: 14 TABLET | Refills: 0 | Status: SHIPPED | OUTPATIENT
Start: 2025-01-30 | End: 2025-02-06

## 2025-01-30 RX ORDER — PREDNISONE 20 MG/1
20 TABLET ORAL 2 TIMES DAILY
Qty: 10 TABLET | Refills: 0 | Status: SHIPPED | OUTPATIENT
Start: 2025-02-01 | End: 2025-01-30

## 2025-01-30 RX ORDER — BETAMETHASONE DIPROPIONATE 0.5 MG/G
CREAM TOPICAL 2 TIMES DAILY
Qty: 45 G | Refills: 1 | Status: SHIPPED | OUTPATIENT
Start: 2025-01-30 | End: 2025-02-09

## 2025-01-30 RX ORDER — METHYLPREDNISOLONE ACETATE 80 MG/ML
80 INJECTION, SUSPENSION INTRA-ARTICULAR; INTRALESIONAL; INTRAMUSCULAR; SOFT TISSUE
Status: DISCONTINUED | OUTPATIENT
Start: 2025-01-30 | End: 2025-01-30

## 2025-01-30 NOTE — PROGRESS NOTES
Subjective:      Patient ID: Bonny Luciano is a 79 y.o. female.    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      AFib  H/o MVR  Chronic hypoxic respiratory failure (2 liters)   T2DM  Hypothyroidism  Vit D deficiency    PSH:  MVR  Hysterectomy  Melanoma excision from vulva    SH:  No tobacco, EtOH, illicit substances       Bonny Lyle is a 78 y.o. female with mucosal melanoma (vulvovaginal, bladder, urethra) PmHX includes adrenal insufficieny.     She was initially diagnosed with melanoma in situ of the vulva in 5/2019 and again 7/2019. It was able to be seen on PETCT 7/2019. She received 4 induction cycles of ipilimumab + nivolumab combination followed by single agent nivolumab started 8/12/2019-4/2021. She was also applying Aldara during 7/2020-3/2021. She underwent partial vulvectomy 5/14/21. She continued with applying Aldara cream every 3 days at least until 3/2023. 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 She was discussed at [facility] and was dispositioned to [facility]. She received [facility] x15 into different areas including the urethra, vagina, clitoral yoon 5/2023-12/2023. Lesions responded but new clitoral mass 12/2023 showed bx proven melanoma. She had a PET/CT and MRI pelvis that showed new foci of uptake in the vulva and vagina. Bx of vulvovaginal areas 2/2024 confirmed melanomas. She started ipilimumab + nivolumab 3/21/2024.             Patient is here today with complaints of generalized rash and itching.  Onset Tuesday.  The only thing she can think of that she is allergic to is depth soap, which she has discontinued.  She is currently on hydrocortisone 15 mg in the morning and 10 mg in the afternoon.  She is  also taking hydroxyzine p.r.n. itching, which she states is ineffective.  She has also tried triamcinolone topical cream which is ineffective.  Denies shortness or breath, chest pain, wheezing, dysphagia, choking, globus sensation, sore throat, drooling.     No other issues reported.       Past Medical History:   Diagnosis Date    Anticoagulant long-term use     Asthma     Bladder disorder     Bladder problem     CHF (congestive heart failure)     GERD (gastroesophageal reflux disease)     Hyperlipidemia     Hypertension     Malignant melanoma of torso excluding breast 03/26/2019    Melanoma in situ of torso excluding breast 04/17/2019    Muscle atrophy     Paroxysmal atrial fibrillation     Unsteady gait       Social History     Socioeconomic History    Marital status:    Tobacco Use    Smoking status: Never    Smokeless tobacco: Never   Substance and Sexual Activity    Alcohol use: Yes     Comment: X mas, New year, Birthday    Drug use: No    Sexual activity: Yes      Family History   Problem Relation Name Age of Onset    Diabetes Mother          ROS:   Review of Systems   Constitutional:  Negative for appetite change, chills and fever.   HENT:  Negative for facial swelling, rhinorrhea, sneezing, sore throat, trouble swallowing and voice change.    Eyes:  Negative for photophobia, itching and visual disturbance.   Respiratory:  Negative for cough, choking, chest tightness, shortness of breath, wheezing and stridor.    Cardiovascular:  Negative for chest pain and palpitations.   Gastrointestinal:  Negative for abdominal pain and vomiting.   Genitourinary:  Negative for difficulty urinating.   Musculoskeletal:  Negative for myalgias.   Skin:  Positive for rash. Negative for pallor and wound.   Allergic/Immunologic: Positive for environmental allergies and immunocompromised state. Negative for food allergies.   Neurological:  Negative for dizziness, syncope, weakness and headaches.   Hematological:  Negative  for adenopathy. Does not bruise/bleed easily.   Psychiatric/Behavioral:  Negative for dysphoric mood, sleep disturbance and suicidal ideas. The patient is not nervous/anxious.      Objective:   Physical Exam  Vitals and nursing note reviewed.   Constitutional:       General: She is awake. She is not in acute distress.     Appearance: Normal appearance. She is overweight. She is not ill-appearing, toxic-appearing or diaphoretic.       HENT:      Nose: Nose normal. No congestion or rhinorrhea.      Mouth/Throat:      Mouth: Mucous membranes are moist.      Pharynx: No oropharyngeal exudate or posterior oropharyngeal erythema.   Eyes:      Pupils: Pupils are equal, round, and reactive to light.   Cardiovascular:      Rate and Rhythm: Normal rate.   Pulmonary:      Effort: Pulmonary effort is normal.      Breath sounds: Normal breath sounds. No wheezing or rales.   Abdominal:      General: Abdomen is flat. Bowel sounds are normal.      Tenderness: There is no right CVA tenderness or left CVA tenderness.   Musculoskeletal:         General: Normal range of motion.      Cervical back: Normal range of motion.   Skin:     General: Skin is warm.      Findings: Rash (generalized) present. Rash is urticarial.   Neurological:      Mental Status: She is alert and oriented to person, place, and time. Mental status is at baseline.   Psychiatric:         Mood and Affect: Mood normal.         Behavior: Behavior normal. Behavior is cooperative.         Thought Content: Thought content normal.         Judgment: Judgment normal.       Assessment:     1. Urticaria      No images are attached to the encounter.   Plan:     Problem List Items Addressed This Visit    None  Visit Diagnoses       Urticaria    -  Primary    Relevant Medications    famotidine (PEPCID) 20 MG tablet    betamethasone dipropionate 0.05 % cream          PLAN  PT advised to consult w/ [facility] given she is on Hydrocortisone. Would need taper of high dose  corticosteroid since nonresponsive to hydrocortisone.  We will call out topical betamethasone and famotidine p.o. she voiced understanding of instructions to call [facility]      Procedures     No visits with results within 1 Month(s) from this visit.   Latest known visit with results is:   Office Visit on 12/17/2024   Component Date Value Ref Range Status    Color, UA 12/17/2024 YELLOW   Final    Clarity, UA 12/17/2024 CLEAR   Final    Specific Gravity,UA 12/17/2024 1.010  1.005 - 1.030 Final    pH, Urine 12/17/2024 5  5 - 7.5 Final    Leukocytes, UA 12/17/2024 NEGATIVE  NEGATIVE Final    Nitrite, Urine 12/17/2024 POSITIVE (A)  NEGATIVE Final    Protein, UA 12/17/2024 NEGATIVE  NEGATIVE mg/dL Final    Glucose, UA 12/17/2024 1,000 (H)  NEGATIVE mg/dL Final    Ketones, UA 12/17/2024 NEGATIVE  NEGATIVE mg/dL Final    Urobilinogen, urine 12/17/2024 NORMAL  0 - 1.0 E.U./dL Final    Bilirubin (UA) 12/17/2024 NEGATIVE  NEGATIVE Final    Occult Blood 12/17/2024 NEGATIVE  NEGATIVE Final    WBC/HPF 12/17/2024 10-15 (A)  <5 Final    RBC/HPF 12/17/2024 RARE  <5 Final    Amorphous, UA 12/17/2024 NEGATIVE   Final    Bacteria, UA 12/17/2024 4+ (A)  NEG-TRACE Final    Epithelial Cells 12/17/2024 FEW  NEGATIVE-FEW Final    Mucus, UA 12/17/2024 NEGATIVE  NEGATIVE Final    Hyaline Casts, UA 12/17/2024 0-2 PER LPF   Final    Comment: NOTE  Testing performed at:  [facility], [address] [place] [address]  [id number] CLIA #:[id number]      Urine Culture, Routine 12/17/2024    Final    Comment: Source: URINE  Site:  Organism #1                    ESCHERICHIA COLI  Quantity                      >100,000    Antibiotic Susceptibility  Organism #                1  Isolate                   E. Coli  Antibiotic                INT   SRINATH  Amikacin                  S     <=16  Amoxicillin/K Clavulanate S     <=8/4  Ampicillin                S     <=8  Ampicillin/sulbactam      S     <=4/2  Aztreonam                 S      <=4  Cefazolin                 S     <=2  Cefepime                  S     <=2  Cefotaxime                S     <=2  Cefoxitin                 S     <=8  Ceftazidime               S     <=1  Ceftazidime/Avibactam     S     <=4  Ceftolozane/Tazobactam    S     <=2  Ceftriaxone               S     <=1  Cefuroxime                S     <=4  Ciprofloxacin             S     <=0.25  Ertapenem                 S     <=0.5  Gentamycin                S     <=2  Imipenem                  S     <=1  Levofloxacin              S     <=0.5  Meropenem/Vaborbactam     S     <=2  Nitrofu                           rantoin            S     <=32  Piperacillin/tazobactam   S     <=8  Tetracycline              S     <=4  Tobramycin                S     <=2  Trimethoprim/sulfa        S     <=0.5/9.5    S=Susceptible   I=Intermediate    R=Resistant    INT=Interpretation   SRINATH=Minimum Inhibitory Concentration  NOTE  Testing performed at:  [facility], [address] [place] [address] CLIA #:[id number]          No results found in the last 30 days.       Duration of encounter:  minutes  This includes face-to-face time and non face-to-face time preparing to see the patient (eg, review of tests), obtaining and/or reviewing separately obtained history, documenting clinical information in the electronic or other health record, independently interpreting resultsand communicating results to the patient/family/caregiver, or care coordination      DISCLAIMER: This note was prepared with [employer] voice recognition transcription software. Garbled syntax, mangled pronouns, and other bizarre constructions may be attributed to that software system.     All diagnostic data (labs/imaging) was reviewed with the patient and/or family member in the room. All preventative measures (vaccinations, screenings, testing, imaging) were discussed in depth and offered to the patient in accordance with current medical guidelines to ensure the patient is  up to date.  All questions were answered to their liking. The patient and/or family member voiced understanding of all instructions provided. Expectations regarding follow up and treatment plan were voiced and confirmed prior to departure. The patient was given orders/instructions at the end of the visit for reference. They were instructed to notify my office if they have not been contacted for imaging/referrals/labs/results in 1-2 weeks. They voiced understanding of all of the above.     Follow up:     There are no Patient Instructions on file for this visit.     No follow-ups on file.

## 2025-02-04 ENCOUNTER — TELEPHONE (OUTPATIENT)
Dept: FAMILY MEDICINE | Facility: CLINIC | Age: 80
End: 2025-02-04
Payer: MEDICARE

## 2025-02-04 RX ORDER — METHYLPREDNISOLONE 4 MG/1
TABLET ORAL
Qty: 21 EACH | Refills: 0 | Status: SHIPPED | OUTPATIENT
Start: 2025-02-04 | End: 2025-02-25

## 2025-02-04 NOTE — TELEPHONE ENCOUNTER
----- Message from Med Assistant Wood sent at 2/4/2025 11:17 AM CST -----  Pt igor cancer MD feels there's no issue w/ doing steroid taper and would prefer for you to prescribe and manage.  ----- Message -----  From: Nina Coburn MA  Sent: 2/3/2025   9:13 AM CST  To: Nina Coburn MA      ----- Message -----  From: Miranda Jackson  Sent: 2/3/2025   8:46 AM CST  To: [facility] Staff    Patient requesting call back in regards to cancer treatment. Please call her back at [phone]

## (undated) DEVICE — PACK PERI/GYN OPTIMA

## (undated) DEVICE — GOWN SURGICAL X-LARGE

## (undated) DEVICE — LUBRICANT SURGILUBE 2 OZ

## (undated) DEVICE — DRESSING TELFA STRL 4X3 LF

## (undated) DEVICE — SEE MEDLINE ITEM 154981

## (undated) DEVICE — SEE MEDLINE ITEM 152622

## (undated) DEVICE — PAD ABD 8X10 STERILE

## (undated) DEVICE — SYS LABEL CORRECT MED

## (undated) DEVICE — PUNCH BIOPSY 4MM STERILE DISPO